# Patient Record
Sex: MALE | Race: WHITE | NOT HISPANIC OR LATINO | Employment: FULL TIME | URBAN - METROPOLITAN AREA
[De-identification: names, ages, dates, MRNs, and addresses within clinical notes are randomized per-mention and may not be internally consistent; named-entity substitution may affect disease eponyms.]

---

## 2017-01-01 ENCOUNTER — APPOINTMENT (OUTPATIENT)
Dept: RADIOLOGY | Facility: MEDICAL CENTER | Age: 82
DRG: 011 | End: 2017-01-01
Attending: HOSPITALIST
Payer: COMMERCIAL

## 2017-01-01 ENCOUNTER — HOME CARE VISIT (OUTPATIENT)
Dept: HOSPICE | Facility: HOSPICE | Age: 82
End: 2017-01-01
Payer: COMMERCIAL

## 2017-01-01 ENCOUNTER — HOSPITAL ENCOUNTER (EMERGENCY)
Facility: MEDICAL CENTER | Age: 82
End: 2017-12-23
Attending: EMERGENCY MEDICINE
Payer: COMMERCIAL

## 2017-01-01 ENCOUNTER — APPOINTMENT (OUTPATIENT)
Dept: RADIOLOGY | Facility: MEDICAL CENTER | Age: 82
DRG: 011 | End: 2017-01-01
Attending: INTERNAL MEDICINE
Payer: COMMERCIAL

## 2017-01-01 ENCOUNTER — APPOINTMENT (OUTPATIENT)
Dept: RADIOLOGY | Facility: MEDICAL CENTER | Age: 82
DRG: 011 | End: 2017-01-01
Attending: EMERGENCY MEDICINE
Payer: COMMERCIAL

## 2017-01-01 ENCOUNTER — APPOINTMENT (OUTPATIENT)
Dept: RADIOLOGY | Facility: MEDICAL CENTER | Age: 82
DRG: 011 | End: 2017-01-01
Attending: OTOLARYNGOLOGY
Payer: COMMERCIAL

## 2017-01-01 ENCOUNTER — HOSPICE ADMISSION (OUTPATIENT)
Dept: HOSPICE | Facility: HOSPICE | Age: 82
End: 2017-01-01
Payer: COMMERCIAL

## 2017-01-01 ENCOUNTER — APPOINTMENT (OUTPATIENT)
Dept: HOSPICE | Facility: HOSPICE | Age: 82
End: 2017-01-01
Payer: COMMERCIAL

## 2017-01-01 ENCOUNTER — RESOLUTE PROFESSIONAL BILLING HOSPITAL PROF FEE (OUTPATIENT)
Dept: HOSPITALIST | Facility: MEDICAL CENTER | Age: 82
End: 2017-01-01
Payer: COMMERCIAL

## 2017-01-01 ENCOUNTER — HOSPITAL ENCOUNTER (OUTPATIENT)
Facility: MEDICAL CENTER | Age: 82
End: 2017-11-30
Payer: COMMERCIAL

## 2017-01-01 ENCOUNTER — HOSPITAL ENCOUNTER (INPATIENT)
Facility: MEDICAL CENTER | Age: 82
LOS: 13 days | DRG: 011 | End: 2017-12-14
Attending: EMERGENCY MEDICINE | Admitting: HOSPITALIST
Payer: COMMERCIAL

## 2017-01-01 VITALS
HEART RATE: 70 BPM | OXYGEN SATURATION: 97 % | DIASTOLIC BLOOD PRESSURE: 62 MMHG | RESPIRATION RATE: 16 BRPM | SYSTOLIC BLOOD PRESSURE: 118 MMHG

## 2017-01-01 VITALS — HEART RATE: 56 BPM | OXYGEN SATURATION: 98 % | RESPIRATION RATE: 20 BRPM

## 2017-01-01 VITALS
HEIGHT: 66 IN | TEMPERATURE: 98.6 F | RESPIRATION RATE: 20 BRPM | WEIGHT: 104 LBS | HEART RATE: 72 BPM | OXYGEN SATURATION: 99 % | BODY MASS INDEX: 16.71 KG/M2 | SYSTOLIC BLOOD PRESSURE: 129 MMHG | DIASTOLIC BLOOD PRESSURE: 88 MMHG

## 2017-01-01 VITALS — SYSTOLIC BLOOD PRESSURE: 130 MMHG | RESPIRATION RATE: 22 BRPM | DIASTOLIC BLOOD PRESSURE: 76 MMHG | HEART RATE: 76 BPM

## 2017-01-01 VITALS
WEIGHT: 104.94 LBS | RESPIRATION RATE: 22 BRPM | OXYGEN SATURATION: 99 % | TEMPERATURE: 96.9 F | SYSTOLIC BLOOD PRESSURE: 153 MMHG | HEART RATE: 74 BPM | BODY MASS INDEX: 16.87 KG/M2 | DIASTOLIC BLOOD PRESSURE: 72 MMHG | HEIGHT: 66 IN

## 2017-01-01 VITALS
DIASTOLIC BLOOD PRESSURE: 80 MMHG | SYSTOLIC BLOOD PRESSURE: 132 MMHG | RESPIRATION RATE: 24 BRPM | HEART RATE: 63 BPM | OXYGEN SATURATION: 97 %

## 2017-01-01 VITALS
DIASTOLIC BLOOD PRESSURE: 97 MMHG | RESPIRATION RATE: 20 BRPM | HEART RATE: 81 BPM | SYSTOLIC BLOOD PRESSURE: 157 MMHG | OXYGEN SATURATION: 98 %

## 2017-01-01 VITALS
DIASTOLIC BLOOD PRESSURE: 75 MMHG | HEART RATE: 62 BPM | RESPIRATION RATE: 16 BRPM | OXYGEN SATURATION: 98 % | SYSTOLIC BLOOD PRESSURE: 150 MMHG

## 2017-01-01 VITALS
RESPIRATION RATE: 28 BRPM | SYSTOLIC BLOOD PRESSURE: 177 MMHG | OXYGEN SATURATION: 96 % | HEART RATE: 52 BPM | DIASTOLIC BLOOD PRESSURE: 58 MMHG

## 2017-01-01 VITALS
RESPIRATION RATE: 16 BRPM | HEART RATE: 56 BPM | OXYGEN SATURATION: 97 % | DIASTOLIC BLOOD PRESSURE: 84 MMHG | SYSTOLIC BLOOD PRESSURE: 116 MMHG

## 2017-01-01 VITALS
DIASTOLIC BLOOD PRESSURE: 74 MMHG | OXYGEN SATURATION: 98 % | SYSTOLIC BLOOD PRESSURE: 157 MMHG | HEART RATE: 56 BPM | RESPIRATION RATE: 18 BRPM

## 2017-01-01 VITALS
OXYGEN SATURATION: 99 % | RESPIRATION RATE: 72 BRPM | DIASTOLIC BLOOD PRESSURE: 70 MMHG | SYSTOLIC BLOOD PRESSURE: 152 MMHG | HEART RATE: 24 BPM

## 2017-01-01 VITALS — RESPIRATION RATE: 20 BRPM

## 2017-01-01 DIAGNOSIS — J95.03 TRACHEOSTOMY MALFUNCTION (HCC): ICD-10-CM

## 2017-01-01 DIAGNOSIS — J38.7 LARYNGEAL MASS: ICD-10-CM

## 2017-01-01 DIAGNOSIS — C14.0 THROAT CANCER (HCC): ICD-10-CM

## 2017-01-01 LAB
ALBUMIN SERPL BCP-MCNC: 2.1 G/DL (ref 3.2–4.9)
ALBUMIN SERPL BCP-MCNC: 2.3 G/DL (ref 3.2–4.9)
ALBUMIN SERPL BCP-MCNC: 2.4 G/DL (ref 3.2–4.9)
ALBUMIN SERPL BCP-MCNC: 2.5 G/DL (ref 3.2–4.9)
ALBUMIN SERPL BCP-MCNC: 3.4 G/DL (ref 3.2–4.9)
ALBUMIN/GLOB SERPL: 1 G/DL
ALBUMIN/GLOB SERPL: 1.1 G/DL
ALBUMIN/GLOB SERPL: 1.2 G/DL
ALP SERPL-CCNC: 59 U/L (ref 30–99)
ALP SERPL-CCNC: 63 U/L (ref 30–99)
ALP SERPL-CCNC: 64 U/L (ref 30–99)
ALP SERPL-CCNC: 70 U/L (ref 30–99)
ALP SERPL-CCNC: 83 U/L (ref 30–99)
ALT SERPL-CCNC: 11 U/L (ref 2–50)
ALT SERPL-CCNC: 12 U/L (ref 2–50)
ALT SERPL-CCNC: 13 U/L (ref 2–50)
ALT SERPL-CCNC: 13 U/L (ref 2–50)
ALT SERPL-CCNC: 8 U/L (ref 2–50)
ANION GAP SERPL CALC-SCNC: 10 MMOL/L (ref 0–11.9)
ANION GAP SERPL CALC-SCNC: 12 MMOL/L (ref 0–11.9)
ANION GAP SERPL CALC-SCNC: 5 MMOL/L (ref 0–11.9)
ANION GAP SERPL CALC-SCNC: 7 MMOL/L (ref 0–11.9)
ANION GAP SERPL CALC-SCNC: 8 MMOL/L (ref 0–11.9)
ANION GAP SERPL CALC-SCNC: 8 MMOL/L (ref 0–11.9)
ANION GAP SERPL CALC-SCNC: 9 MMOL/L (ref 0–11.9)
AST SERPL-CCNC: 14 U/L (ref 12–45)
AST SERPL-CCNC: 17 U/L (ref 12–45)
AST SERPL-CCNC: 18 U/L (ref 12–45)
AST SERPL-CCNC: 23 U/L (ref 12–45)
AST SERPL-CCNC: 57 U/L (ref 12–45)
BACTERIA BLD CULT: NORMAL
BACTERIA BLD CULT: NORMAL
BACTERIA SPEC RESP CULT: NORMAL
BASOPHILS # BLD AUTO: 0.1 % (ref 0–1.8)
BASOPHILS # BLD AUTO: 0.2 % (ref 0–1.8)
BASOPHILS # BLD AUTO: 0.3 % (ref 0–1.8)
BASOPHILS # BLD AUTO: 0.4 % (ref 0–1.8)
BASOPHILS # BLD: 0.01 K/UL (ref 0–0.12)
BASOPHILS # BLD: 0.03 K/UL (ref 0–0.12)
BASOPHILS # BLD: 0.04 K/UL (ref 0–0.12)
BASOPHILS # BLD: 0.06 K/UL (ref 0–0.12)
BILIRUB SERPL-MCNC: 0.5 MG/DL (ref 0.1–1.5)
BILIRUB SERPL-MCNC: 0.5 MG/DL (ref 0.1–1.5)
BILIRUB SERPL-MCNC: 0.6 MG/DL (ref 0.1–1.5)
BILIRUB SERPL-MCNC: 0.9 MG/DL (ref 0.1–1.5)
BILIRUB SERPL-MCNC: 1 MG/DL (ref 0.1–1.5)
BUN SERPL-MCNC: 11 MG/DL (ref 8–22)
BUN SERPL-MCNC: 12 MG/DL (ref 8–22)
BUN SERPL-MCNC: 15 MG/DL (ref 8–22)
BUN SERPL-MCNC: 21 MG/DL (ref 8–22)
BUN SERPL-MCNC: 28 MG/DL (ref 8–22)
BUN SERPL-MCNC: 35 MG/DL (ref 8–22)
BUN SERPL-MCNC: 7 MG/DL (ref 8–22)
BUN SERPL-MCNC: 7 MG/DL (ref 8–22)
BUN SERPL-MCNC: 9 MG/DL (ref 8–22)
CALCIUM SERPL-MCNC: 7.3 MG/DL (ref 8.5–10.5)
CALCIUM SERPL-MCNC: 7.6 MG/DL (ref 8.5–10.5)
CALCIUM SERPL-MCNC: 7.7 MG/DL (ref 8.5–10.5)
CALCIUM SERPL-MCNC: 7.9 MG/DL (ref 8.5–10.5)
CALCIUM SERPL-MCNC: 7.9 MG/DL (ref 8.5–10.5)
CALCIUM SERPL-MCNC: 8.2 MG/DL (ref 8.5–10.5)
CALCIUM SERPL-MCNC: 8.7 MG/DL (ref 8.5–10.5)
CALCIUM SERPL-MCNC: 8.8 MG/DL (ref 8.5–10.5)
CALCIUM SERPL-MCNC: 9.2 MG/DL (ref 8.5–10.5)
CHLORIDE SERPL-SCNC: 102 MMOL/L (ref 96–112)
CHLORIDE SERPL-SCNC: 104 MMOL/L (ref 96–112)
CHLORIDE SERPL-SCNC: 104 MMOL/L (ref 96–112)
CHLORIDE SERPL-SCNC: 105 MMOL/L (ref 96–112)
CHLORIDE SERPL-SCNC: 106 MMOL/L (ref 96–112)
CHLORIDE SERPL-SCNC: 107 MMOL/L (ref 96–112)
CHLORIDE SERPL-SCNC: 109 MMOL/L (ref 96–112)
CHLORIDE SERPL-SCNC: 112 MMOL/L (ref 96–112)
CHLORIDE SERPL-SCNC: 114 MMOL/L (ref 96–112)
CO2 SERPL-SCNC: 16 MMOL/L (ref 20–33)
CO2 SERPL-SCNC: 18 MMOL/L (ref 20–33)
CO2 SERPL-SCNC: 18 MMOL/L (ref 20–33)
CO2 SERPL-SCNC: 21 MMOL/L (ref 20–33)
CO2 SERPL-SCNC: 24 MMOL/L (ref 20–33)
CO2 SERPL-SCNC: 24 MMOL/L (ref 20–33)
CO2 SERPL-SCNC: 25 MMOL/L (ref 20–33)
CO2 SERPL-SCNC: 25 MMOL/L (ref 20–33)
CO2 SERPL-SCNC: 29 MMOL/L (ref 20–33)
CREAT SERPL-MCNC: 0.39 MG/DL (ref 0.5–1.4)
CREAT SERPL-MCNC: 0.4 MG/DL (ref 0.5–1.4)
CREAT SERPL-MCNC: 0.47 MG/DL (ref 0.5–1.4)
CREAT SERPL-MCNC: 0.48 MG/DL (ref 0.5–1.4)
CREAT SERPL-MCNC: 0.5 MG/DL (ref 0.5–1.4)
CREAT SERPL-MCNC: 0.51 MG/DL (ref 0.5–1.4)
CREAT SERPL-MCNC: 0.71 MG/DL (ref 0.5–1.4)
CREAT SERPL-MCNC: 1.03 MG/DL (ref 0.5–1.4)
CREAT SERPL-MCNC: 2.04 MG/DL (ref 0.5–1.4)
CRP SERPL HS-MCNC: 2.67 MG/DL (ref 0–0.75)
EKG IMPRESSION: NORMAL
EKG IMPRESSION: NORMAL
EOSINOPHIL # BLD AUTO: 0 K/UL (ref 0–0.51)
EOSINOPHIL # BLD AUTO: 0 K/UL (ref 0–0.51)
EOSINOPHIL # BLD AUTO: 0.01 K/UL (ref 0–0.51)
EOSINOPHIL # BLD AUTO: 0.01 K/UL (ref 0–0.51)
EOSINOPHIL # BLD AUTO: 0.03 K/UL (ref 0–0.51)
EOSINOPHIL # BLD AUTO: 0.08 K/UL (ref 0–0.51)
EOSINOPHIL NFR BLD: 0 % (ref 0–6.9)
EOSINOPHIL NFR BLD: 0 % (ref 0–6.9)
EOSINOPHIL NFR BLD: 0.1 % (ref 0–6.9)
EOSINOPHIL NFR BLD: 0.1 % (ref 0–6.9)
EOSINOPHIL NFR BLD: 0.2 % (ref 0–6.9)
EOSINOPHIL NFR BLD: 0.7 % (ref 0–6.9)
ERYTHROCYTE [DISTWIDTH] IN BLOOD BY AUTOMATED COUNT: 41.9 FL (ref 35.9–50)
ERYTHROCYTE [DISTWIDTH] IN BLOOD BY AUTOMATED COUNT: 44.8 FL (ref 35.9–50)
ERYTHROCYTE [DISTWIDTH] IN BLOOD BY AUTOMATED COUNT: 45.4 FL (ref 35.9–50)
ERYTHROCYTE [DISTWIDTH] IN BLOOD BY AUTOMATED COUNT: 45.8 FL (ref 35.9–50)
ERYTHROCYTE [DISTWIDTH] IN BLOOD BY AUTOMATED COUNT: 46.8 FL (ref 35.9–50)
ERYTHROCYTE [DISTWIDTH] IN BLOOD BY AUTOMATED COUNT: 47.3 FL (ref 35.9–50)
ERYTHROCYTE [DISTWIDTH] IN BLOOD BY AUTOMATED COUNT: 48.2 FL (ref 35.9–50)
ERYTHROCYTE [DISTWIDTH] IN BLOOD BY AUTOMATED COUNT: 48.9 FL (ref 35.9–50)
GFR SERPL CREATININE-BSD FRML MDRD: 31 ML/MIN/1.73 M 2
GFR SERPL CREATININE-BSD FRML MDRD: >60 ML/MIN/1.73 M 2
GLOBULIN SER CALC-MCNC: 2.2 G/DL (ref 1.9–3.5)
GLOBULIN SER CALC-MCNC: 2.3 G/DL (ref 1.9–3.5)
GLOBULIN SER CALC-MCNC: 2.3 G/DL (ref 1.9–3.5)
GLOBULIN SER CALC-MCNC: 2.4 G/DL (ref 1.9–3.5)
GLOBULIN SER CALC-MCNC: 2.9 G/DL (ref 1.9–3.5)
GLUCOSE SERPL-MCNC: 107 MG/DL (ref 65–99)
GLUCOSE SERPL-MCNC: 115 MG/DL (ref 65–99)
GLUCOSE SERPL-MCNC: 116 MG/DL (ref 65–99)
GLUCOSE SERPL-MCNC: 124 MG/DL (ref 65–99)
GLUCOSE SERPL-MCNC: 126 MG/DL (ref 65–99)
GLUCOSE SERPL-MCNC: 129 MG/DL (ref 65–99)
GLUCOSE SERPL-MCNC: 152 MG/DL (ref 65–99)
GLUCOSE SERPL-MCNC: 82 MG/DL (ref 65–99)
GLUCOSE SERPL-MCNC: 91 MG/DL (ref 65–99)
GRAM STN SPEC: NORMAL
HCT VFR BLD AUTO: 34.1 % (ref 42–52)
HCT VFR BLD AUTO: 34.7 % (ref 42–52)
HCT VFR BLD AUTO: 36.5 % (ref 42–52)
HCT VFR BLD AUTO: 39.4 % (ref 42–52)
HCT VFR BLD AUTO: 39.6 % (ref 42–52)
HCT VFR BLD AUTO: 39.8 % (ref 42–52)
HCT VFR BLD AUTO: 40.1 % (ref 42–52)
HCT VFR BLD AUTO: 42.1 % (ref 42–52)
HGB BLD-MCNC: 12.1 G/DL (ref 14–18)
HGB BLD-MCNC: 12.2 G/DL (ref 14–18)
HGB BLD-MCNC: 12.7 G/DL (ref 14–18)
HGB BLD-MCNC: 13.3 G/DL (ref 14–18)
HGB BLD-MCNC: 13.3 G/DL (ref 14–18)
HGB BLD-MCNC: 13.4 G/DL (ref 14–18)
HGB BLD-MCNC: 13.7 G/DL (ref 14–18)
HGB BLD-MCNC: 14.3 G/DL (ref 14–18)
IMM GRANULOCYTES # BLD AUTO: 0.06 K/UL (ref 0–0.11)
IMM GRANULOCYTES # BLD AUTO: 0.08 K/UL (ref 0–0.11)
IMM GRANULOCYTES # BLD AUTO: 0.09 K/UL (ref 0–0.11)
IMM GRANULOCYTES # BLD AUTO: 0.1 K/UL (ref 0–0.11)
IMM GRANULOCYTES # BLD AUTO: 0.14 K/UL (ref 0–0.11)
IMM GRANULOCYTES # BLD AUTO: 0.19 K/UL (ref 0–0.11)
IMM GRANULOCYTES NFR BLD AUTO: 0.5 % (ref 0–0.9)
IMM GRANULOCYTES NFR BLD AUTO: 0.5 % (ref 0–0.9)
IMM GRANULOCYTES NFR BLD AUTO: 0.6 % (ref 0–0.9)
IMM GRANULOCYTES NFR BLD AUTO: 0.6 % (ref 0–0.9)
IMM GRANULOCYTES NFR BLD AUTO: 0.7 % (ref 0–0.9)
IMM GRANULOCYTES NFR BLD AUTO: 1 % (ref 0–0.9)
LV EJECT FRACT  99904: 55
LV EJECT FRACT MOD 2C 99903: 61.5
LV EJECT FRACT MOD 4C 99902: 54.57
LV EJECT FRACT MOD BP 99901: 55.81
LYMPHOCYTES # BLD AUTO: 0.54 K/UL (ref 1–4.8)
LYMPHOCYTES # BLD AUTO: 0.86 K/UL (ref 1–4.8)
LYMPHOCYTES # BLD AUTO: 0.89 K/UL (ref 1–4.8)
LYMPHOCYTES # BLD AUTO: 0.97 K/UL (ref 1–4.8)
LYMPHOCYTES # BLD AUTO: 1.3 K/UL (ref 1–4.8)
LYMPHOCYTES # BLD AUTO: 1.74 K/UL (ref 1–4.8)
LYMPHOCYTES NFR BLD: 15.6 % (ref 22–41)
LYMPHOCYTES NFR BLD: 2.3 % (ref 22–41)
LYMPHOCYTES NFR BLD: 5.3 % (ref 22–41)
LYMPHOCYTES NFR BLD: 6.5 % (ref 22–41)
LYMPHOCYTES NFR BLD: 6.6 % (ref 22–41)
LYMPHOCYTES NFR BLD: 7.2 % (ref 22–41)
MAGNESIUM SERPL-MCNC: 1.5 MG/DL (ref 1.5–2.5)
MAGNESIUM SERPL-MCNC: 1.6 MG/DL (ref 1.5–2.5)
MAGNESIUM SERPL-MCNC: 1.7 MG/DL (ref 1.5–2.5)
MAGNESIUM SERPL-MCNC: 1.9 MG/DL (ref 1.5–2.5)
MAGNESIUM SERPL-MCNC: 2.1 MG/DL (ref 1.5–2.5)
MCH RBC QN AUTO: 28.7 PG (ref 27–33)
MCH RBC QN AUTO: 28.8 PG (ref 27–33)
MCH RBC QN AUTO: 28.9 PG (ref 27–33)
MCH RBC QN AUTO: 29 PG (ref 27–33)
MCH RBC QN AUTO: 29.1 PG (ref 27–33)
MCH RBC QN AUTO: 29.4 PG (ref 27–33)
MCH RBC QN AUTO: 29.4 PG (ref 27–33)
MCH RBC QN AUTO: 29.8 PG (ref 27–33)
MCHC RBC AUTO-ENTMCNC: 33.4 G/DL (ref 33.7–35.3)
MCHC RBC AUTO-ENTMCNC: 33.6 G/DL (ref 33.7–35.3)
MCHC RBC AUTO-ENTMCNC: 34 G/DL (ref 33.7–35.3)
MCHC RBC AUTO-ENTMCNC: 34 G/DL (ref 33.7–35.3)
MCHC RBC AUTO-ENTMCNC: 34.2 G/DL (ref 33.7–35.3)
MCHC RBC AUTO-ENTMCNC: 34.8 G/DL (ref 33.7–35.3)
MCHC RBC AUTO-ENTMCNC: 34.9 G/DL (ref 33.7–35.3)
MCHC RBC AUTO-ENTMCNC: 35.8 G/DL (ref 33.7–35.3)
MCV RBC AUTO: 82.4 FL (ref 81.4–97.8)
MCV RBC AUTO: 83.4 FL (ref 81.4–97.8)
MCV RBC AUTO: 84.2 FL (ref 81.4–97.8)
MCV RBC AUTO: 84.2 FL (ref 81.4–97.8)
MCV RBC AUTO: 85.4 FL (ref 81.4–97.8)
MCV RBC AUTO: 85.5 FL (ref 81.4–97.8)
MCV RBC AUTO: 86.1 FL (ref 81.4–97.8)
MCV RBC AUTO: 88.1 FL (ref 81.4–97.8)
MONOCYTES # BLD AUTO: 1.05 K/UL (ref 0–0.85)
MONOCYTES # BLD AUTO: 1.3 K/UL (ref 0–0.85)
MONOCYTES # BLD AUTO: 1.33 K/UL (ref 0–0.85)
MONOCYTES # BLD AUTO: 1.52 K/UL (ref 0–0.85)
MONOCYTES # BLD AUTO: 1.66 K/UL (ref 0–0.85)
MONOCYTES # BLD AUTO: 1.81 K/UL (ref 0–0.85)
MONOCYTES NFR BLD AUTO: 10.8 % (ref 0–13.4)
MONOCYTES NFR BLD AUTO: 12.2 % (ref 0–13.4)
MONOCYTES NFR BLD AUTO: 6.6 % (ref 0–13.4)
MONOCYTES NFR BLD AUTO: 8 % (ref 0–13.4)
MONOCYTES NFR BLD AUTO: 8.5 % (ref 0–13.4)
MONOCYTES NFR BLD AUTO: 9.4 % (ref 0–13.4)
NEUTROPHILS # BLD AUTO: 11.98 K/UL (ref 1.82–7.42)
NEUTROPHILS # BLD AUTO: 13.91 K/UL (ref 1.82–7.42)
NEUTROPHILS # BLD AUTO: 16.46 K/UL (ref 1.82–7.42)
NEUTROPHILS # BLD AUTO: 20.84 K/UL (ref 1.82–7.42)
NEUTROPHILS # BLD AUTO: 8.15 K/UL (ref 1.82–7.42)
NEUTROPHILS # BLD AUTO: 9.94 K/UL (ref 1.82–7.42)
NEUTROPHILS NFR BLD: 73.4 % (ref 44–72)
NEUTROPHILS NFR BLD: 80.6 % (ref 44–72)
NEUTROPHILS NFR BLD: 81 % (ref 44–72)
NEUTROPHILS NFR BLD: 83.7 % (ref 44–72)
NEUTROPHILS NFR BLD: 85.9 % (ref 44–72)
NEUTROPHILS NFR BLD: 90.2 % (ref 44–72)
NRBC # BLD AUTO: 0 K/UL
NRBC BLD AUTO-RTO: 0 /100 WBC
PHOSPHATE SERPL-MCNC: 1.9 MG/DL (ref 2.5–4.5)
PHOSPHATE SERPL-MCNC: 2.1 MG/DL (ref 2.5–4.5)
PHOSPHATE SERPL-MCNC: 2.2 MG/DL (ref 2.5–4.5)
PHOSPHATE SERPL-MCNC: 2.5 MG/DL (ref 2.5–4.5)
PHOSPHATE SERPL-MCNC: 2.7 MG/DL (ref 2.5–4.5)
PHOSPHATE SERPL-MCNC: 3 MG/DL (ref 2.5–4.5)
PHOSPHATE SERPL-MCNC: 3.1 MG/DL (ref 2.5–4.5)
PHOSPHATE SERPL-MCNC: 4 MG/DL (ref 2.5–4.5)
PLATELET # BLD AUTO: 358 K/UL (ref 164–446)
PLATELET # BLD AUTO: 378 K/UL (ref 164–446)
PLATELET # BLD AUTO: 396 K/UL (ref 164–446)
PLATELET # BLD AUTO: 405 K/UL (ref 164–446)
PLATELET # BLD AUTO: 427 K/UL (ref 164–446)
PLATELET # BLD AUTO: 437 K/UL (ref 164–446)
PLATELET # BLD AUTO: 473 K/UL (ref 164–446)
PLATELET # BLD AUTO: 522 K/UL (ref 164–446)
PMV BLD AUTO: 10.3 FL (ref 9–12.9)
PMV BLD AUTO: 9.1 FL (ref 9–12.9)
PMV BLD AUTO: 9.2 FL (ref 9–12.9)
PMV BLD AUTO: 9.4 FL (ref 9–12.9)
PMV BLD AUTO: 9.6 FL (ref 9–12.9)
PMV BLD AUTO: 9.6 FL (ref 9–12.9)
PMV BLD AUTO: 9.7 FL (ref 9–12.9)
PMV BLD AUTO: 9.9 FL (ref 9–12.9)
POTASSIUM SERPL-SCNC: 2.8 MMOL/L (ref 3.6–5.5)
POTASSIUM SERPL-SCNC: 2.8 MMOL/L (ref 3.6–5.5)
POTASSIUM SERPL-SCNC: 3.1 MMOL/L (ref 3.6–5.5)
POTASSIUM SERPL-SCNC: 3.3 MMOL/L (ref 3.6–5.5)
POTASSIUM SERPL-SCNC: 3.4 MMOL/L (ref 3.6–5.5)
POTASSIUM SERPL-SCNC: 3.5 MMOL/L (ref 3.6–5.5)
POTASSIUM SERPL-SCNC: 3.5 MMOL/L (ref 3.6–5.5)
POTASSIUM SERPL-SCNC: 3.8 MMOL/L (ref 3.6–5.5)
POTASSIUM SERPL-SCNC: 3.9 MMOL/L (ref 3.6–5.5)
PREALB SERPL-MCNC: 5 MG/DL (ref 18–38)
PROT SERPL-MCNC: 4.3 G/DL (ref 6–8.2)
PROT SERPL-MCNC: 4.6 G/DL (ref 6–8.2)
PROT SERPL-MCNC: 4.8 G/DL (ref 6–8.2)
PROT SERPL-MCNC: 4.8 G/DL (ref 6–8.2)
PROT SERPL-MCNC: 6.3 G/DL (ref 6–8.2)
RBC # BLD AUTO: 4.09 M/UL (ref 4.7–6.1)
RBC # BLD AUTO: 4.12 M/UL (ref 4.7–6.1)
RBC # BLD AUTO: 4.43 M/UL (ref 4.7–6.1)
RBC # BLD AUTO: 4.52 M/UL (ref 4.7–6.1)
RBC # BLD AUTO: 4.6 M/UL (ref 4.7–6.1)
RBC # BLD AUTO: 4.61 M/UL (ref 4.7–6.1)
RBC # BLD AUTO: 4.76 M/UL (ref 4.7–6.1)
RBC # BLD AUTO: 4.93 M/UL (ref 4.7–6.1)
SIGNIFICANT IND 70042: NORMAL
SITE SITE: NORMAL
SODIUM SERPL-SCNC: 136 MMOL/L (ref 135–145)
SODIUM SERPL-SCNC: 136 MMOL/L (ref 135–145)
SODIUM SERPL-SCNC: 137 MMOL/L (ref 135–145)
SODIUM SERPL-SCNC: 138 MMOL/L (ref 135–145)
SODIUM SERPL-SCNC: 139 MMOL/L (ref 135–145)
SOURCE SOURCE: NORMAL
T4 FREE SERPL-MCNC: 1.39 NG/DL (ref 0.53–1.43)
T4 SERPL-MCNC: 7.2 UG/DL (ref 4–12)
TSH SERPL DL<=0.005 MIU/L-ACNC: 0.47 UIU/ML (ref 0.3–3.7)
WBC # BLD AUTO: 11.1 K/UL (ref 4.8–10.8)
WBC # BLD AUTO: 12.3 K/UL (ref 4.8–10.8)
WBC # BLD AUTO: 14.9 K/UL (ref 4.8–10.8)
WBC # BLD AUTO: 14.9 K/UL (ref 4.8–10.8)
WBC # BLD AUTO: 15.8 K/UL (ref 4.8–10.8)
WBC # BLD AUTO: 16.2 K/UL (ref 4.8–10.8)
WBC # BLD AUTO: 19.6 K/UL (ref 4.8–10.8)
WBC # BLD AUTO: 23.1 K/UL (ref 4.8–10.8)

## 2017-01-01 PROCEDURE — 700105 HCHG RX REV CODE 258

## 2017-01-01 PROCEDURE — 770020 HCHG ROOM/CARE - TELE (206)

## 2017-01-01 PROCEDURE — 700111 HCHG RX REV CODE 636 W/ 250 OVERRIDE (IP): Performed by: HOSPITALIST

## 2017-01-01 PROCEDURE — 0CBS8ZX EXCISION OF LARYNX, VIA NATURAL OR ARTIFICIAL OPENING ENDOSCOPIC, DIAGNOSTIC: ICD-10-PCS | Performed by: OTOLARYNGOLOGY

## 2017-01-01 PROCEDURE — A9270 NON-COVERED ITEM OR SERVICE: HCPCS | Performed by: HOSPITALIST

## 2017-01-01 PROCEDURE — 85025 COMPLETE CBC W/AUTO DIFF WBC: CPT

## 2017-01-01 PROCEDURE — 36415 COLL VENOUS BLD VENIPUNCTURE: CPT

## 2017-01-01 PROCEDURE — 700111 HCHG RX REV CODE 636 W/ 250 OVERRIDE (IP): Performed by: OTOLARYNGOLOGY

## 2017-01-01 PROCEDURE — 94640 AIRWAY INHALATION TREATMENT: CPT

## 2017-01-01 PROCEDURE — G0299 HHS/HOSPICE OF RN EA 15 MIN: HCPCS

## 2017-01-01 PROCEDURE — 700102 HCHG RX REV CODE 250 W/ 637 OVERRIDE(OP): Performed by: HOSPITALIST

## 2017-01-01 PROCEDURE — 700111 HCHG RX REV CODE 636 W/ 250 OVERRIDE (IP): Performed by: INTERNAL MEDICINE

## 2017-01-01 PROCEDURE — 99498 ADVNCD CARE PLAN ADDL 30 MIN: CPT | Performed by: HOSPITALIST

## 2017-01-01 PROCEDURE — 93306 TTE W/DOPPLER COMPLETE: CPT | Mod: 26 | Performed by: INTERNAL MEDICINE

## 2017-01-01 PROCEDURE — S9126 HOSPICE CARE, IN THE HOME, P: HCPCS

## 2017-01-01 PROCEDURE — 88305 TISSUE EXAM BY PATHOLOGIST: CPT

## 2017-01-01 PROCEDURE — 700101 HCHG RX REV CODE 250: Performed by: INTERNAL MEDICINE

## 2017-01-01 PROCEDURE — 700105 HCHG RX REV CODE 258: Performed by: INTERNAL MEDICINE

## 2017-01-01 PROCEDURE — 99232 SBSQ HOSP IP/OBS MODERATE 35: CPT | Performed by: INTERNAL MEDICINE

## 2017-01-01 PROCEDURE — 0DJ08ZZ INSPECTION OF UPPER INTESTINAL TRACT, VIA NATURAL OR ARTIFICIAL OPENING ENDOSCOPIC: ICD-10-PCS | Performed by: OTOLARYNGOLOGY

## 2017-01-01 PROCEDURE — 93010 ELECTROCARDIOGRAM REPORT: CPT | Performed by: INTERNAL MEDICINE

## 2017-01-01 PROCEDURE — 99497 ADVNCD CARE PLAN 30 MIN: CPT | Performed by: HOSPITALIST

## 2017-01-01 PROCEDURE — 99233 SBSQ HOSP IP/OBS HIGH 50: CPT | Performed by: FAMILY MEDICINE

## 2017-01-01 PROCEDURE — 501838 HCHG SUTURE GENERAL: Performed by: OTOLARYNGOLOGY

## 2017-01-01 PROCEDURE — 700111 HCHG RX REV CODE 636 W/ 250 OVERRIDE (IP)

## 2017-01-01 PROCEDURE — 76775 US EXAM ABDO BACK WALL LIM: CPT

## 2017-01-01 PROCEDURE — 84100 ASSAY OF PHOSPHORUS: CPT

## 2017-01-01 PROCEDURE — 160009 HCHG ANES TIME/MIN: Performed by: OTOLARYNGOLOGY

## 2017-01-01 PROCEDURE — G8979 MOBILITY GOAL STATUS: HCPCS | Mod: CJ

## 2017-01-01 PROCEDURE — 87070 CULTURE OTHR SPECIMN AEROBIC: CPT

## 2017-01-01 PROCEDURE — 80053 COMPREHEN METABOLIC PANEL: CPT

## 2017-01-01 PROCEDURE — 86140 C-REACTIVE PROTEIN: CPT

## 2017-01-01 PROCEDURE — G0156 HHCP-SVS OF AIDE,EA 15 MIN: HCPCS

## 2017-01-01 PROCEDURE — 83735 ASSAY OF MAGNESIUM: CPT

## 2017-01-01 PROCEDURE — 0B110F4 BYPASS TRACHEA TO CUTANEOUS WITH TRACHEOSTOMY DEVICE, OPEN APPROACH: ICD-10-PCS | Performed by: OTOLARYNGOLOGY

## 2017-01-01 PROCEDURE — G9162 LANG EXPRESS CURRENT STATUS: HCPCS | Mod: CM

## 2017-01-01 PROCEDURE — 94760 N-INVAS EAR/PLS OXIMETRY 1: CPT

## 2017-01-01 PROCEDURE — 6650303 HCR  ALOE VESTA BATH/BODY SHAMPOO

## 2017-01-01 PROCEDURE — 700105 HCHG RX REV CODE 258: Performed by: HOSPITALIST

## 2017-01-01 PROCEDURE — A9270 NON-COVERED ITEM OR SERVICE: HCPCS | Performed by: INTERNAL MEDICINE

## 2017-01-01 PROCEDURE — 84443 ASSAY THYROID STIM HORMONE: CPT

## 2017-01-01 PROCEDURE — G0155 HHCP-SVS OF CSW,EA 15 MIN: HCPCS

## 2017-01-01 PROCEDURE — 99233 SBSQ HOSP IP/OBS HIGH 50: CPT | Performed by: HOSPITALIST

## 2017-01-01 PROCEDURE — 93306 TTE W/DOPPLER COMPLETE: CPT

## 2017-01-01 PROCEDURE — G8988 SELF CARE GOAL STATUS: HCPCS | Mod: CI

## 2017-01-01 PROCEDURE — 88342 IMHCHEM/IMCYTCHM 1ST ANTB: CPT | Mod: 91

## 2017-01-01 PROCEDURE — 770004 HCHG ROOM/CARE - ONCOLOGY PRIVATE *

## 2017-01-01 PROCEDURE — 700101 HCHG RX REV CODE 250: Performed by: HOSPITALIST

## 2017-01-01 PROCEDURE — 770022 HCHG ROOM/CARE - ICU (200)

## 2017-01-01 PROCEDURE — A6250 SKIN SEAL PROTECT MOISTURIZR: HCPCS

## 2017-01-01 PROCEDURE — 51798 US URINE CAPACITY MEASURE: CPT

## 2017-01-01 PROCEDURE — A5120 SKIN BARRIER, WIPE OR SWAB: HCPCS

## 2017-01-01 PROCEDURE — G8988 SELF CARE GOAL STATUS: HCPCS | Mod: CK

## 2017-01-01 PROCEDURE — 99233 SBSQ HOSP IP/OBS HIGH 50: CPT | Mod: 25 | Performed by: HOSPITALIST

## 2017-01-01 PROCEDURE — 80048 BASIC METABOLIC PNL TOTAL CA: CPT

## 2017-01-01 PROCEDURE — 160048 HCHG OR STATISTICAL LEVEL 1-5: Performed by: OTOLARYNGOLOGY

## 2017-01-01 PROCEDURE — 306796: Performed by: OTOLARYNGOLOGY

## 2017-01-01 PROCEDURE — 85027 COMPLETE CBC AUTOMATED: CPT

## 2017-01-01 PROCEDURE — 93005 ELECTROCARDIOGRAM TRACING: CPT | Performed by: HOSPITALIST

## 2017-01-01 PROCEDURE — 99284 EMERGENCY DEPT VISIT MOD MDM: CPT

## 2017-01-01 PROCEDURE — 99223 1ST HOSP IP/OBS HIGH 75: CPT | Performed by: HOSPITALIST

## 2017-01-01 PROCEDURE — 74000 DX-ABDOMEN-1 VIEW: CPT

## 2017-01-01 PROCEDURE — G8978 MOBILITY CURRENT STATUS: HCPCS | Mod: CK

## 2017-01-01 PROCEDURE — L8501 TRACHEOSTOMY SPEAKING VALVE: HCPCS

## 2017-01-01 PROCEDURE — 84439 ASSAY OF FREE THYROXINE: CPT

## 2017-01-01 PROCEDURE — 160039 HCHG SURGERY MINUTES - EA ADDL 1 MIN LEVEL 3: Performed by: OTOLARYNGOLOGY

## 2017-01-01 PROCEDURE — 71010 DX-CHEST-PORTABLE (1 VIEW): CPT

## 2017-01-01 PROCEDURE — 97762 HCHG PROSTHESIS CHECKOUT EA 15 MIN: CPT

## 2017-01-01 PROCEDURE — 99291 CRITICAL CARE FIRST HOUR: CPT | Performed by: INTERNAL MEDICINE

## 2017-01-01 PROCEDURE — 87205 SMEAR GRAM STAIN: CPT

## 2017-01-01 PROCEDURE — G8987 SELF CARE CURRENT STATUS: HCPCS | Mod: CK

## 2017-01-01 PROCEDURE — 87040 BLOOD CULTURE FOR BACTERIA: CPT

## 2017-01-01 PROCEDURE — A4520 INCONTINENCE GARMENT ANYTYPE: HCPCS

## 2017-01-01 PROCEDURE — 160028 HCHG SURGERY MINUTES - 1ST 30 MINS LEVEL 3: Performed by: OTOLARYNGOLOGY

## 2017-01-01 PROCEDURE — 770001 HCHG ROOM/CARE - MED/SURG/GYN PRIV*

## 2017-01-01 PROCEDURE — 70491 CT SOFT TISSUE NECK W/DYE: CPT

## 2017-01-01 PROCEDURE — 665036 HSPC NOTICE OF ELECTION NOE

## 2017-01-01 PROCEDURE — A4554 DISPOSABLE UNDERPADS: HCPCS

## 2017-01-01 PROCEDURE — 93005 ELECTROCARDIOGRAM TRACING: CPT | Performed by: OTOLARYNGOLOGY

## 2017-01-01 PROCEDURE — G9163 LANG EXPRESS GOAL STATUS: HCPCS | Mod: CI

## 2017-01-01 PROCEDURE — 99239 HOSP IP/OBS DSCHRG MGMT >30: CPT | Performed by: FAMILY MEDICINE

## 2017-01-01 PROCEDURE — 700102 HCHG RX REV CODE 250 W/ 637 OVERRIDE(OP): Performed by: INTERNAL MEDICINE

## 2017-01-01 PROCEDURE — 70360 X-RAY EXAM OF NECK: CPT

## 2017-01-01 PROCEDURE — 99285 EMERGENCY DEPT VISIT HI MDM: CPT

## 2017-01-01 PROCEDURE — 92522 EVALUATE SPEECH PRODUCTION: CPT

## 2017-01-01 PROCEDURE — 97162 PT EVAL MOD COMPLEX 30 MIN: CPT

## 2017-01-01 PROCEDURE — 84134 ASSAY OF PREALBUMIN: CPT

## 2017-01-01 PROCEDURE — 71260 CT THORAX DX C+: CPT

## 2017-01-01 PROCEDURE — 700101 HCHG RX REV CODE 250

## 2017-01-01 PROCEDURE — 97166 OT EVAL MOD COMPLEX 45 MIN: CPT

## 2017-01-01 PROCEDURE — 700117 HCHG RX CONTRAST REV CODE 255: Performed by: EMERGENCY MEDICINE

## 2017-01-01 PROCEDURE — G8989 SELF CARE D/C STATUS: HCPCS | Mod: CK

## 2017-01-01 RX ORDER — AMIODARONE HYDROCHLORIDE 200 MG/1
400 TABLET ORAL TWICE DAILY
Status: DISCONTINUED | OUTPATIENT
Start: 2017-01-01 | End: 2017-01-01

## 2017-01-01 RX ORDER — POTASSIUM CHLORIDE 7.45 MG/ML
10 INJECTION INTRAVENOUS
Status: DISCONTINUED | OUTPATIENT
Start: 2017-01-01 | End: 2017-01-01

## 2017-01-01 RX ORDER — POLYETHYLENE GLYCOL 3350 17 G/17G
1 POWDER, FOR SOLUTION ORAL
Status: DISCONTINUED | OUTPATIENT
Start: 2017-01-01 | End: 2017-01-01

## 2017-01-01 RX ORDER — HEPARIN SODIUM 5000 [USP'U]/ML
5000 INJECTION, SOLUTION INTRAVENOUS; SUBCUTANEOUS EVERY 8 HOURS
Status: DISCONTINUED | OUTPATIENT
Start: 2017-01-01 | End: 2017-01-01

## 2017-01-01 RX ORDER — MAGNESIUM SULFATE HEPTAHYDRATE 40 MG/ML
4 INJECTION, SOLUTION INTRAVENOUS ONCE
Status: COMPLETED | OUTPATIENT
Start: 2017-01-01 | End: 2017-01-01

## 2017-01-01 RX ORDER — MORPHINE SULFATE 4 MG/ML
2 INJECTION, SOLUTION INTRAMUSCULAR; INTRAVENOUS
Status: DISCONTINUED | OUTPATIENT
Start: 2017-01-01 | End: 2017-01-01 | Stop reason: HOSPADM

## 2017-01-01 RX ORDER — MAGNESIUM SULFATE HEPTAHYDRATE 40 MG/ML
4 INJECTION, SOLUTION INTRAVENOUS ONCE
Status: DISCONTINUED | OUTPATIENT
Start: 2017-01-01 | End: 2017-01-01

## 2017-01-01 RX ORDER — ONDANSETRON 2 MG/ML
4 INJECTION INTRAMUSCULAR; INTRAVENOUS EVERY 4 HOURS PRN
Status: DISCONTINUED | OUTPATIENT
Start: 2017-01-01 | End: 2017-01-01 | Stop reason: HOSPADM

## 2017-01-01 RX ORDER — SODIUM CHLORIDE AND POTASSIUM CHLORIDE 300; 900 MG/100ML; MG/100ML
INJECTION, SOLUTION INTRAVENOUS ONCE
Status: DISCONTINUED | OUTPATIENT
Start: 2017-01-01 | End: 2017-01-01

## 2017-01-01 RX ORDER — BISACODYL 10 MG
10 SUPPOSITORY, RECTAL RECTAL
Status: DISCONTINUED | OUTPATIENT
Start: 2017-01-01 | End: 2017-01-01

## 2017-01-01 RX ORDER — ATROPINE SULFATE 10 MG/ML
2 SOLUTION/ DROPS OPHTHALMIC EVERY 4 HOURS PRN
Status: DISCONTINUED | OUTPATIENT
Start: 2017-01-01 | End: 2017-01-01 | Stop reason: HOSPADM

## 2017-01-01 RX ORDER — DEXTROSE MONOHYDRATE 50 MG/ML
INJECTION, SOLUTION INTRAVENOUS
Status: COMPLETED
Start: 2017-01-01 | End: 2017-01-01

## 2017-01-01 RX ORDER — ONDANSETRON 2 MG/ML
8 INJECTION INTRAMUSCULAR; INTRAVENOUS EVERY 8 HOURS PRN
Status: DISCONTINUED | OUTPATIENT
Start: 2017-01-01 | End: 2017-01-01 | Stop reason: HOSPADM

## 2017-01-01 RX ORDER — MORPHINE SULFATE 100 MG/5ML
10 SOLUTION ORAL
Status: DISCONTINUED | OUTPATIENT
Start: 2017-01-01 | End: 2017-01-01 | Stop reason: HOSPADM

## 2017-01-01 RX ORDER — SCOLOPAMINE TRANSDERMAL SYSTEM 1 MG/1
1 PATCH, EXTENDED RELEASE TRANSDERMAL
Status: DISCONTINUED | OUTPATIENT
Start: 2017-01-01 | End: 2017-01-01 | Stop reason: HOSPADM

## 2017-01-01 RX ORDER — HALOPERIDOL 5 MG/ML
2-5 INJECTION INTRAMUSCULAR EVERY 4 HOURS PRN
Status: DISCONTINUED | OUTPATIENT
Start: 2017-01-01 | End: 2017-01-01 | Stop reason: HOSPADM

## 2017-01-01 RX ORDER — MORPHINE SULFATE 10 MG/ML
5 INJECTION, SOLUTION INTRAMUSCULAR; INTRAVENOUS
Status: DISCONTINUED | OUTPATIENT
Start: 2017-01-01 | End: 2017-01-01 | Stop reason: HOSPADM

## 2017-01-01 RX ORDER — LORAZEPAM 2 MG/ML
1 INJECTION INTRAMUSCULAR
Status: DISCONTINUED | OUTPATIENT
Start: 2017-01-01 | End: 2017-01-01 | Stop reason: HOSPADM

## 2017-01-01 RX ORDER — MORPHINE SULFATE 10 MG/ML
10 INJECTION, SOLUTION INTRAMUSCULAR; INTRAVENOUS
Status: DISCONTINUED | OUTPATIENT
Start: 2017-01-01 | End: 2017-01-01 | Stop reason: HOSPADM

## 2017-01-01 RX ORDER — AMOXICILLIN 250 MG
2 CAPSULE ORAL 2 TIMES DAILY
Status: DISCONTINUED | OUTPATIENT
Start: 2017-01-01 | End: 2017-01-01

## 2017-01-01 RX ORDER — SODIUM CHLORIDE AND POTASSIUM CHLORIDE 300; 900 MG/100ML; MG/100ML
INJECTION, SOLUTION INTRAVENOUS CONTINUOUS
Status: DISCONTINUED | OUTPATIENT
Start: 2017-01-01 | End: 2017-01-01

## 2017-01-01 RX ORDER — POTASSIUM CHLORIDE 20 MEQ/1
20 TABLET, EXTENDED RELEASE ORAL 2 TIMES DAILY
Status: COMPLETED | OUTPATIENT
Start: 2017-01-01 | End: 2017-01-01

## 2017-01-01 RX ORDER — MAGNESIUM SULFATE HEPTAHYDRATE 40 MG/ML
2 INJECTION, SOLUTION INTRAVENOUS
Status: COMPLETED | OUTPATIENT
Start: 2017-01-01 | End: 2017-01-01

## 2017-01-01 RX ORDER — GLYCOPYRROLATE 1 MG/1
1 TABLET ORAL 3 TIMES DAILY PRN
Status: DISCONTINUED | OUTPATIENT
Start: 2017-01-01 | End: 2017-01-01 | Stop reason: HOSPADM

## 2017-01-01 RX ORDER — ONDANSETRON 8 MG/1
8 TABLET, ORALLY DISINTEGRATING ORAL EVERY 8 HOURS PRN
Status: DISCONTINUED | OUTPATIENT
Start: 2017-01-01 | End: 2017-01-01 | Stop reason: HOSPADM

## 2017-01-01 RX ORDER — ONDANSETRON 4 MG/1
4 TABLET, ORALLY DISINTEGRATING ORAL EVERY 4 HOURS PRN
Status: DISCONTINUED | OUTPATIENT
Start: 2017-01-01 | End: 2017-01-01 | Stop reason: HOSPADM

## 2017-01-01 RX ORDER — HYDRALAZINE HYDROCHLORIDE 20 MG/ML
10-20 INJECTION INTRAMUSCULAR; INTRAVENOUS EVERY 4 HOURS PRN
Status: DISCONTINUED | OUTPATIENT
Start: 2017-01-01 | End: 2017-01-01

## 2017-01-01 RX ORDER — POLYVINYL ALCOHOL 14 MG/ML
2 SOLUTION/ DROPS OPHTHALMIC EVERY 6 HOURS PRN
Status: DISCONTINUED | OUTPATIENT
Start: 2017-01-01 | End: 2017-01-01 | Stop reason: HOSPADM

## 2017-01-01 RX ORDER — LORAZEPAM 2 MG/ML
1 CONCENTRATE ORAL
Status: DISCONTINUED | OUTPATIENT
Start: 2017-01-01 | End: 2017-01-01 | Stop reason: HOSPADM

## 2017-01-01 RX ORDER — DEXTROSE MONOHYDRATE 50 MG/ML
500 INJECTION, SOLUTION INTRAVENOUS CONTINUOUS
Status: DISCONTINUED | OUTPATIENT
Start: 2017-01-01 | End: 2017-01-01

## 2017-01-01 RX ORDER — ACETAMINOPHEN 325 MG/1
650 TABLET ORAL EVERY 6 HOURS PRN
Status: DISCONTINUED | OUTPATIENT
Start: 2017-01-01 | End: 2017-01-01 | Stop reason: HOSPADM

## 2017-01-01 RX ORDER — SODIUM CHLORIDE 9 MG/ML
INJECTION, SOLUTION INTRAVENOUS CONTINUOUS
Status: DISCONTINUED | OUTPATIENT
Start: 2017-01-01 | End: 2017-01-01

## 2017-01-01 RX ORDER — MORPHINE SULFATE 4 MG/ML
2 INJECTION, SOLUTION INTRAMUSCULAR; INTRAVENOUS
Status: DISCONTINUED | OUTPATIENT
Start: 2017-01-01 | End: 2017-01-01

## 2017-01-01 RX ORDER — ENALAPRILAT 1.25 MG/ML
1.25 INJECTION INTRAVENOUS EVERY 6 HOURS PRN
Status: DISCONTINUED | OUTPATIENT
Start: 2017-01-01 | End: 2017-01-01

## 2017-01-01 RX ORDER — DEXTROSE MONOHYDRATE 50 MG/ML
INJECTION, SOLUTION INTRAVENOUS CONTINUOUS
Status: DISCONTINUED | OUTPATIENT
Start: 2017-01-01 | End: 2017-01-01

## 2017-01-01 RX ADMIN — DOXYCYCLINE 100 MG: 100 INJECTION, POWDER, LYOPHILIZED, FOR SOLUTION INTRAVENOUS at 20:00

## 2017-01-01 RX ADMIN — DEXTROSE MONOHYDRATE 500 ML: 50 INJECTION, SOLUTION INTRAVENOUS at 20:00

## 2017-01-01 RX ADMIN — SODIUM CHLORIDE: 9 INJECTION, SOLUTION INTRAVENOUS at 04:45

## 2017-01-01 RX ADMIN — AMIODARONE HYDROCHLORIDE 0.51 MG/MIN: 50 INJECTION, SOLUTION INTRAVENOUS at 11:41

## 2017-01-01 RX ADMIN — ENALAPRILAT 1.25 MG: 1.25 INJECTION, SOLUTION INTRAVENOUS at 10:13

## 2017-01-01 RX ADMIN — HALOPERIDOL LACTATE 5 MG: 5 INJECTION, SOLUTION INTRAMUSCULAR at 05:00

## 2017-01-01 RX ADMIN — HEPARIN SODIUM 5000 UNITS: 5000 INJECTION, SOLUTION INTRAVENOUS; SUBCUTANEOUS at 05:58

## 2017-01-01 RX ADMIN — DEXTROSE MONOHYDRATE: 50 INJECTION, SOLUTION INTRAVENOUS at 15:23

## 2017-01-01 RX ADMIN — SODIUM CHLORIDE: 9 INJECTION, SOLUTION INTRAVENOUS at 12:37

## 2017-01-01 RX ADMIN — HEPARIN SODIUM 5000 UNITS: 5000 INJECTION, SOLUTION INTRAVENOUS; SUBCUTANEOUS at 05:10

## 2017-01-01 RX ADMIN — STANDARDIZED SENNA CONCENTRATE AND DOCUSATE SODIUM 2 TABLET: 8.6; 5 TABLET, FILM COATED ORAL at 08:07

## 2017-01-01 RX ADMIN — MORPHINE SULFATE 2 MG: 4 INJECTION INTRAVENOUS at 02:58

## 2017-01-01 RX ADMIN — HEPARIN SODIUM 5000 UNITS: 5000 INJECTION, SOLUTION INTRAVENOUS; SUBCUTANEOUS at 05:22

## 2017-01-01 RX ADMIN — MORPHINE SULFATE 2 MG: 4 INJECTION INTRAVENOUS at 01:33

## 2017-01-01 RX ADMIN — HEPARIN SODIUM 5000 UNITS: 5000 INJECTION, SOLUTION INTRAVENOUS; SUBCUTANEOUS at 20:36

## 2017-01-01 RX ADMIN — MORPHINE SULFATE 2 MG: 4 INJECTION INTRAVENOUS at 10:39

## 2017-01-01 RX ADMIN — MORPHINE SULFATE 2 MG: 4 INJECTION INTRAVENOUS at 08:19

## 2017-01-01 RX ADMIN — AMIODARONE HYDROCHLORIDE 0.5 MG/MIN: 50 INJECTION, SOLUTION INTRAVENOUS at 13:20

## 2017-01-01 RX ADMIN — SODIUM CHLORIDE: 9 INJECTION, SOLUTION INTRAVENOUS at 05:07

## 2017-01-01 RX ADMIN — HALOPERIDOL LACTATE 5 MG: 5 INJECTION, SOLUTION INTRAMUSCULAR at 14:23

## 2017-01-01 RX ADMIN — HEPARIN SODIUM 5000 UNITS: 5000 INJECTION, SOLUTION INTRAVENOUS; SUBCUTANEOUS at 01:00

## 2017-01-01 RX ADMIN — POTASSIUM CHLORIDE AND SODIUM CHLORIDE: 900; 300 INJECTION, SOLUTION INTRAVENOUS at 14:07

## 2017-01-01 RX ADMIN — DOXYCYCLINE 100 MG: 100 INJECTION, POWDER, LYOPHILIZED, FOR SOLUTION INTRAVENOUS at 07:51

## 2017-01-01 RX ADMIN — DOXYCYCLINE 100 MG: 100 INJECTION, POWDER, LYOPHILIZED, FOR SOLUTION INTRAVENOUS at 21:47

## 2017-01-01 RX ADMIN — HEPARIN SODIUM 5000 UNITS: 5000 INJECTION, SOLUTION INTRAVENOUS; SUBCUTANEOUS at 13:41

## 2017-01-01 RX ADMIN — HEPARIN SODIUM 5000 UNITS: 5000 INJECTION, SOLUTION INTRAVENOUS; SUBCUTANEOUS at 21:23

## 2017-01-01 RX ADMIN — STANDARDIZED SENNA CONCENTRATE AND DOCUSATE SODIUM 2 TABLET: 8.6; 5 TABLET, FILM COATED ORAL at 21:23

## 2017-01-01 RX ADMIN — MORPHINE SULFATE 2 MG: 4 INJECTION INTRAVENOUS at 16:26

## 2017-01-01 RX ADMIN — MORPHINE SULFATE 2 MG: 4 INJECTION INTRAVENOUS at 21:45

## 2017-01-01 RX ADMIN — AMIODARONE HYDROCHLORIDE 0.5 MG/MIN: 50 INJECTION, SOLUTION INTRAVENOUS at 21:08

## 2017-01-01 RX ADMIN — DEXTROSE MONOHYDRATE: 50 INJECTION, SOLUTION INTRAVENOUS at 20:35

## 2017-01-01 RX ADMIN — MORPHINE SULFATE 2 MG: 4 INJECTION INTRAVENOUS at 08:36

## 2017-01-01 RX ADMIN — AMIODARONE HYDROCHLORIDE 0.5 MG/MIN: 50 INJECTION, SOLUTION INTRAVENOUS at 20:41

## 2017-01-01 RX ADMIN — MORPHINE SULFATE 2 MG: 4 INJECTION INTRAVENOUS at 18:21

## 2017-01-01 RX ADMIN — MORPHINE SULFATE 2 MG: 4 INJECTION INTRAVENOUS at 15:01

## 2017-01-01 RX ADMIN — POTASSIUM CHLORIDE 40 MEQ: 2 INJECTION, SOLUTION, CONCENTRATE INTRAVENOUS at 10:48

## 2017-01-01 RX ADMIN — CEFTRIAXONE 2 G: 2 INJECTION, POWDER, FOR SOLUTION INTRAMUSCULAR; INTRAVENOUS at 08:37

## 2017-01-01 RX ADMIN — DEXTROSE MONOHYDRATE 500 ML: 50 INJECTION, SOLUTION INTRAVENOUS at 01:23

## 2017-01-01 RX ADMIN — AMIODARONE HYDROCHLORIDE 0.5 MG/MIN: 50 INJECTION, SOLUTION INTRAVENOUS at 05:58

## 2017-01-01 RX ADMIN — MORPHINE SULFATE 2 MG: 4 INJECTION INTRAVENOUS at 10:11

## 2017-01-01 RX ADMIN — DEXTROSE MONOHYDRATE 500 ML: 50 INJECTION, SOLUTION INTRAVENOUS at 21:21

## 2017-01-01 RX ADMIN — STANDARDIZED SENNA CONCENTRATE AND DOCUSATE SODIUM 2 TABLET: 8.6; 5 TABLET, FILM COATED ORAL at 19:58

## 2017-01-01 RX ADMIN — STANDARDIZED SENNA CONCENTRATE AND DOCUSATE SODIUM 2 TABLET: 8.6; 5 TABLET, FILM COATED ORAL at 08:17

## 2017-01-01 RX ADMIN — SODIUM CHLORIDE: 9 INJECTION, SOLUTION INTRAVENOUS at 18:30

## 2017-01-01 RX ADMIN — HEPARIN SODIUM 5000 UNITS: 5000 INJECTION, SOLUTION INTRAVENOUS; SUBCUTANEOUS at 13:25

## 2017-01-01 RX ADMIN — MAGNESIUM SULFATE IN WATER 4 G: 40 INJECTION, SOLUTION INTRAVENOUS at 10:13

## 2017-01-01 RX ADMIN — HALOPERIDOL LACTATE 5 MG: 5 INJECTION, SOLUTION INTRAMUSCULAR at 15:27

## 2017-01-01 RX ADMIN — POTASSIUM CHLORIDE AND SODIUM CHLORIDE: 900; 300 INJECTION, SOLUTION INTRAVENOUS at 09:41

## 2017-01-01 RX ADMIN — POTASSIUM CHLORIDE: 2 INJECTION, SOLUTION, CONCENTRATE INTRAVENOUS at 10:43

## 2017-01-01 RX ADMIN — HEPARIN SODIUM 5000 UNITS: 5000 INJECTION, SOLUTION INTRAVENOUS; SUBCUTANEOUS at 22:28

## 2017-01-01 RX ADMIN — DOXYCYCLINE 100 MG: 100 INJECTION, POWDER, LYOPHILIZED, FOR SOLUTION INTRAVENOUS at 15:37

## 2017-01-01 RX ADMIN — AMIODARONE HYDROCHLORIDE 0.5 MG/MIN: 50 INJECTION, SOLUTION INTRAVENOUS at 20:35

## 2017-01-01 RX ADMIN — MORPHINE SULFATE 2 MG: 4 INJECTION INTRAVENOUS at 20:00

## 2017-01-01 RX ADMIN — CEFTRIAXONE 2 G: 2 INJECTION, POWDER, FOR SOLUTION INTRAMUSCULAR; INTRAVENOUS at 07:52

## 2017-01-01 RX ADMIN — POTASSIUM PHOSPHATE, MONOBASIC AND POTASSIUM PHOSPHATE, DIBASIC 30 MMOL: 224; 236 INJECTION, SOLUTION INTRAVENOUS at 11:25

## 2017-01-01 RX ADMIN — DOXYCYCLINE 100 MG: 100 INJECTION, POWDER, LYOPHILIZED, FOR SOLUTION INTRAVENOUS at 21:29

## 2017-01-01 RX ADMIN — SODIUM CHLORIDE: 9 INJECTION, SOLUTION INTRAVENOUS at 15:20

## 2017-01-01 RX ADMIN — HEPARIN SODIUM 5000 UNITS: 5000 INJECTION, SOLUTION INTRAVENOUS; SUBCUTANEOUS at 06:25

## 2017-01-01 RX ADMIN — HEPARIN SODIUM 5000 UNITS: 5000 INJECTION, SOLUTION INTRAVENOUS; SUBCUTANEOUS at 05:35

## 2017-01-01 RX ADMIN — HALOPERIDOL LACTATE 5 MG: 5 INJECTION, SOLUTION INTRAMUSCULAR at 10:29

## 2017-01-01 RX ADMIN — HALOPERIDOL LACTATE 5 MG: 5 INJECTION, SOLUTION INTRAMUSCULAR at 01:22

## 2017-01-01 RX ADMIN — AMIODARONE HYDROCHLORIDE 400 MG: 200 TABLET ORAL at 08:48

## 2017-01-01 RX ADMIN — DEXTROSE MONOHYDRATE 500 ML: 50 INJECTION, SOLUTION INTRAVENOUS at 14:12

## 2017-01-01 RX ADMIN — MORPHINE SULFATE 2 MG: 4 INJECTION INTRAVENOUS at 14:00

## 2017-01-01 RX ADMIN — SODIUM CHLORIDE: 9 INJECTION, SOLUTION INTRAVENOUS at 22:26

## 2017-01-01 RX ADMIN — AMIODARONE HYDROCHLORIDE 0.5 MG/MIN: 50 INJECTION, SOLUTION INTRAVENOUS at 04:11

## 2017-01-01 RX ADMIN — STANDARDIZED SENNA CONCENTRATE AND DOCUSATE SODIUM 2 TABLET: 8.6; 5 TABLET, FILM COATED ORAL at 20:41

## 2017-01-01 RX ADMIN — DEXTROSE MONOHYDRATE 500 ML: 50 INJECTION, SOLUTION INTRAVENOUS at 03:33

## 2017-01-01 RX ADMIN — AMIODARONE HYDROCHLORIDE 0.5 MG/MIN: 50 INJECTION, SOLUTION INTRAVENOUS at 15:24

## 2017-01-01 RX ADMIN — MAGNESIUM SULFATE IN WATER 2 G: 40 INJECTION, SOLUTION INTRAVENOUS at 13:39

## 2017-01-01 RX ADMIN — MORPHINE SULFATE 2 MG: 4 INJECTION INTRAVENOUS at 03:15

## 2017-01-01 RX ADMIN — POTASSIUM CHLORIDE 20 MEQ: 1500 TABLET, EXTENDED RELEASE ORAL at 08:07

## 2017-01-01 RX ADMIN — POTASSIUM CHLORIDE AND SODIUM CHLORIDE: 900; 300 INJECTION, SOLUTION INTRAVENOUS at 09:31

## 2017-01-01 RX ADMIN — HEPARIN SODIUM 5000 UNITS: 5000 INJECTION, SOLUTION INTRAVENOUS; SUBCUTANEOUS at 13:02

## 2017-01-01 RX ADMIN — HALOPERIDOL LACTATE 5 MG: 5 INJECTION, SOLUTION INTRAMUSCULAR at 19:30

## 2017-01-01 RX ADMIN — POTASSIUM CHLORIDE AND SODIUM CHLORIDE: 900; 300 INJECTION, SOLUTION INTRAVENOUS at 14:04

## 2017-01-01 RX ADMIN — AMIODARONE HYDROCHLORIDE 1 MG/MIN: 50 INJECTION, SOLUTION INTRAVENOUS at 04:00

## 2017-01-01 RX ADMIN — AMIODARONE HYDROCHLORIDE 0.5 MG/MIN: 50 INJECTION, SOLUTION INTRAVENOUS at 03:10

## 2017-01-01 RX ADMIN — POTASSIUM CHLORIDE 20 MEQ: 1500 TABLET, EXTENDED RELEASE ORAL at 19:58

## 2017-01-01 RX ADMIN — MORPHINE SULFATE 2 MG: 4 INJECTION INTRAVENOUS at 19:30

## 2017-01-01 RX ADMIN — CEFTRIAXONE 2 G: 2 INJECTION, POWDER, FOR SOLUTION INTRAMUSCULAR; INTRAVENOUS at 15:38

## 2017-01-01 RX ADMIN — DOXYCYCLINE 100 MG: 100 INJECTION, POWDER, LYOPHILIZED, FOR SOLUTION INTRAVENOUS at 08:17

## 2017-01-01 RX ADMIN — HALOPERIDOL LACTATE 5 MG: 5 INJECTION, SOLUTION INTRAMUSCULAR at 03:09

## 2017-01-01 RX ADMIN — HALOPERIDOL LACTATE 5 MG: 5 INJECTION, SOLUTION INTRAMUSCULAR at 01:15

## 2017-01-01 RX ADMIN — MORPHINE SULFATE 2 MG: 4 INJECTION INTRAVENOUS at 13:50

## 2017-01-01 RX ADMIN — CEFTRIAXONE 2 G: 2 INJECTION, POWDER, FOR SOLUTION INTRAMUSCULAR; INTRAVENOUS at 08:17

## 2017-01-01 RX ADMIN — STANDARDIZED SENNA CONCENTRATE AND DOCUSATE SODIUM 2 TABLET: 8.6; 5 TABLET, FILM COATED ORAL at 21:29

## 2017-01-01 RX ADMIN — HEPARIN SODIUM 5000 UNITS: 5000 INJECTION, SOLUTION INTRAVENOUS; SUBCUTANEOUS at 14:06

## 2017-01-01 RX ADMIN — HEPARIN SODIUM 5000 UNITS: 5000 INJECTION, SOLUTION INTRAVENOUS; SUBCUTANEOUS at 14:54

## 2017-01-01 RX ADMIN — POTASSIUM CHLORIDE 10 MEQ: 2 INJECTION, SOLUTION, CONCENTRATE INTRAVENOUS at 16:05

## 2017-01-01 RX ADMIN — SODIUM CHLORIDE: 9 INJECTION, SOLUTION INTRAVENOUS at 18:08

## 2017-01-01 RX ADMIN — AMIODARONE HYDROCHLORIDE 400 MG: 200 TABLET ORAL at 21:23

## 2017-01-01 RX ADMIN — HYDRALAZINE HYDROCHLORIDE 20 MG: 20 INJECTION INTRAMUSCULAR; INTRAVENOUS at 23:18

## 2017-01-01 RX ADMIN — DOXYCYCLINE 100 MG: 100 INJECTION, POWDER, LYOPHILIZED, FOR SOLUTION INTRAVENOUS at 08:30

## 2017-01-01 RX ADMIN — HEPARIN SODIUM 5000 UNITS: 5000 INJECTION, SOLUTION INTRAVENOUS; SUBCUTANEOUS at 21:28

## 2017-01-01 RX ADMIN — MORPHINE SULFATE 2 MG: 4 INJECTION INTRAVENOUS at 14:13

## 2017-01-01 RX ADMIN — HEPARIN SODIUM 5000 UNITS: 5000 INJECTION, SOLUTION INTRAVENOUS; SUBCUTANEOUS at 21:06

## 2017-01-01 RX ADMIN — MAGNESIUM SULFATE IN WATER 2 G: 40 INJECTION, SOLUTION INTRAVENOUS at 11:37

## 2017-01-01 RX ADMIN — DEXTROSE MONOHYDRATE 500 ML: 50 INJECTION, SOLUTION INTRAVENOUS at 05:35

## 2017-01-01 RX ADMIN — HEPARIN SODIUM 5000 UNITS: 5000 INJECTION, SOLUTION INTRAVENOUS; SUBCUTANEOUS at 06:00

## 2017-01-01 RX ADMIN — MORPHINE SULFATE 2 MG: 4 INJECTION INTRAVENOUS at 10:30

## 2017-01-01 RX ADMIN — STANDARDIZED SENNA CONCENTRATE AND DOCUSATE SODIUM 2 TABLET: 8.6; 5 TABLET, FILM COATED ORAL at 07:52

## 2017-01-01 RX ADMIN — MORPHINE SULFATE 2 MG: 4 INJECTION INTRAVENOUS at 11:54

## 2017-01-01 RX ADMIN — POTASSIUM CHLORIDE 20 MEQ: 2 INJECTION, SOLUTION, CONCENTRATE INTRAVENOUS at 12:42

## 2017-01-01 RX ADMIN — POTASSIUM CHLORIDE AND SODIUM CHLORIDE: 900; 300 INJECTION, SOLUTION INTRAVENOUS at 05:10

## 2017-01-01 RX ADMIN — HEPARIN SODIUM 5000 UNITS: 5000 INJECTION, SOLUTION INTRAVENOUS; SUBCUTANEOUS at 14:12

## 2017-01-01 RX ADMIN — SODIUM CHLORIDE: 9 INJECTION, SOLUTION INTRAVENOUS at 08:07

## 2017-01-01 RX ADMIN — IOHEXOL 75 ML: 350 INJECTION, SOLUTION INTRAVENOUS at 02:22

## 2017-01-01 RX ADMIN — DEXTROSE MONOHYDRATE 500 ML: 50 INJECTION, SOLUTION INTRAVENOUS at 11:59

## 2017-01-01 RX ADMIN — SODIUM CHLORIDE: 9 INJECTION, SOLUTION INTRAVENOUS at 05:57

## 2017-01-01 RX ADMIN — MAGNESIUM SULFATE IN WATER 4 G: 40 INJECTION, SOLUTION INTRAVENOUS at 10:04

## 2017-01-01 RX ADMIN — ENALAPRILAT 1.25 MG: 1.25 INJECTION, SOLUTION INTRAVENOUS at 20:35

## 2017-01-01 RX ADMIN — STANDARDIZED SENNA CONCENTRATE AND DOCUSATE SODIUM 2 TABLET: 8.6; 5 TABLET, FILM COATED ORAL at 20:00

## 2017-01-01 RX ADMIN — AMIODARONE HYDROCHLORIDE 150 MG: 1.5 INJECTION, SOLUTION INTRAVENOUS at 03:32

## 2017-01-01 RX ADMIN — HALOPERIDOL LACTATE 5 MG: 5 INJECTION, SOLUTION INTRAMUSCULAR at 20:00

## 2017-01-01 RX ADMIN — CEFTRIAXONE 2 G: 2 INJECTION, POWDER, FOR SOLUTION INTRAMUSCULAR; INTRAVENOUS at 08:07

## 2017-01-01 RX ADMIN — AMIODARONE HYDROCHLORIDE 0.5 MG/MIN: 50 INJECTION, SOLUTION INTRAVENOUS at 11:57

## 2017-01-01 SDOH — ECONOMIC STABILITY: HOUSING INSECURITY: UNSAFE COOKING RANGE AREA: 0

## 2017-01-01 SDOH — ECONOMIC STABILITY: GENERAL

## 2017-01-01 SDOH — ECONOMIC STABILITY: HOUSING INSECURITY: UNSAFE APPLIANCES: 0

## 2017-01-01 SDOH — ECONOMIC STABILITY: HOUSING INSECURITY
HOME SAFETY: FAMILY HAS POSSESSION OF O2 SIGN, APARTMENT MANAGER NEEDS TO BE CONTACTED TO APPROVE POSTING. FAMILY WILL F/U ON MONDAY.

## 2017-01-01 ASSESSMENT — ENCOUNTER SYMPTOMS
CHILLS: 0
ABDOMINAL PAIN: 0
SPUTUM COLOR: TAN
SORE THROAT: 1
COUGH CHARACTERISTICS: STRONG
BACK PAIN: 0
SHORTNESS OF BREATH: 0
FATIGUES EASILY: 1
COUGH CHARACTERISTICS: MOIST
ABDOMINAL PAIN: 0
COUGH: 1
CHILLS: 0
COUGH: 1
SPUTUM AMOUNT: COPIOUS
SPUTUM COLOR: TAN
DYSPNEA ACTIVITY LEVEL: AT REST
BACK PAIN: 0
CONSTIPATION: 1
SHORTNESS OF BREATH: 1
COUGH CHARACTERISTICS: MOIST
WEIGHT LOSS: 0
LAST BOWEL MOVEMENT: 64634
PND: 0
DEPRESSION: 0
SHORTNESS OF BREATH: 0
COUGH CHARACTERISTICS: PRODUCTIVE
SPUTUM PRODUCTION: 1
DIZZINESS: 0
COUGH CHARACTERISTICS: PRODUCTIVE
SPUTUM CONSISTENCY: THICK
FEVER: 0
ABDOMINAL PAIN: 0
COUGH: 1
COUGH CHARACTERISTICS: PRODUCTIVE
SPUTUM AMOUNT: COPIOUS
COUGH CHARACTERISTICS: PRODUCTIVE
WEAKNESS: 0
COUGH: 1
DIZZINESS: 0
STOOL FREQUENCY: LESS THAN DAILY
COUGH: 1
FEVER: 0
SLEEP QUALITY: FAIR
SLEEP QUALITY: ADEQUATE
SPUTUM CONSISTENCY: THICK
SPUTUM PRODUCTION: 1
COUGH: 1
SHORTNESS OF BREATH: 0
SORE THROAT: 1
COUGH CHARACTERISTICS: PRODUCTIVE
SPUTUM COLOR: TAN
COUGH CHARACTERISTICS: PRODUCTIVE
PALPITATIONS: 0
COUGH CHARACTERISTICS: PRODUCTIVE
FEVER: 0
PALPITATIONS: 0
SPUTUM CONSISTENCY: TENACIOUS
DYSPNEA ON EXERTION: 1
FATIGUE: 1
FLANK PAIN: 0
SPUTUM PRODUCTION: 1
NERVOUS/ANXIOUS: 0
NERVOUS/ANXIOUS: 0
SENSORY CHANGE: 0
SORE THROAT: 1
COUGH CHARACTERISTICS: MOIST
SLEEP QUALITY: ADEQUATE
BACK PAIN: 0
COUGH CHARACTERISTICS: MOIST
MYALGIAS: 0
COUGH: 1
PALPITATIONS: 0
FEVER: 0
DIZZINESS: 0
MYALGIAS: 0
SPUTUM CONSISTENCY: THIN
SPUTUM PRODUCTION: 1
SOMNOLENCE: 1
STRIDOR: 1
SPUTUM COLOR: PINK
COUGH CHARACTERISTICS: CONGESTED
DRY SKIN: 1
CLAUDICATION: 0
SPEECH CHANGE: 0
SPUTUM COLOR: TAN
SORE THROAT: 1
ABDOMINAL PAIN: 0
LAST BOWEL MOVEMENT: 64641
COUGH CHARACTERISTICS: STRONG
COUGH CHARACTERISTICS: PRODUCTIVE
SPUTUM CONSISTENCY: THICK
FOCAL WEAKNESS: 0
BACK PAIN: 0
DIAPHORESIS: 0
WEAKNESS: 1
CONSTIPATION: 1
SORE THROAT: 1
SHORTNESS OF BREATH: 0
SPUTUM AMOUNT: COPIOUS
NAUSEA: 0
NERVOUS/ANXIOUS: 0
PALPITATIONS: 0
SPUTUM CONSISTENCY: THICK
COUGH CHARACTERISTICS: MOIST
BOWEL PATTERN NORMAL: 1
SORE THROAT: 1
SHORTNESS OF BREATH: 0
SPUTUM COLOR: TAN
FEVER: 0
NERVOUS/ANXIOUS: 0
SHORTNESS OF BREATH: 0
STOOL FREQUENCY: LESS THAN DAILY
LAST BOWEL MOVEMENT: 64630
NAUSEA: 0
BOWEL PATTERN NORMAL: 1
FATIGUES EASILY: 1
NERVOUS/ANXIOUS: 0
CONTUSION: 1
ABDOMINAL PAIN: 0
SPUTUM PRODUCTION: 1
NAUSEA: 0
COUGH: 1
DIZZINESS: 0
STOOL FREQUENCY: DAILY
BOWEL PATTERN NORMAL: 1
ABDOMINAL PAIN: 0
NAUSEA: 0
ABDOMINAL PAIN: 0
BACK PAIN: 0
FEVER: 0
COUGH CHARACTERISTICS: MOIST
COUGH: 0
SHORTNESS OF BREATH: 0
BACK PAIN: 0
BOWEL PATTERN NORMAL: 1
PALPITATIONS: 0
PALPITATIONS: 0
SORE THROAT: 1
NAUSEA: 0
COUGH CHARACTERISTICS: STRONG
BACK PAIN: 0
SLEEP QUALITY: ADEQUATE
HEADACHES: 0
SPUTUM AMOUNT: COPIOUS
CONSTIPATION: 1
MEMORY LOSS: 0
WHEEZING: 0
PALPITATIONS: 0
SLEEP QUALITY: ADEQUATE
NAUSEA: 0
COUGH CHARACTERISTICS: STRONG
FEVER: 0
LAST BOWEL MOVEMENT: 64641
SLEEP QUALITY: FAIR
COUGH CHARACTERISTICS: PRODUCTIVE
NERVOUS/ANXIOUS: 0
COUGH: 1
COUGH: 1
LOWER EXTREMITY EDEMA: 1
DIZZINESS: 0
COUGH CHARACTERISTICS: STRONG
DIZZINESS: 0
DIZZINESS: 0
NERVOUS/ANXIOUS: 0
COUGH: 1
FEVER: 0
SHORTNESS OF BREATH: 1
CONTUSION: 1
COUGH: 1
SPUTUM PRODUCTION: 1
COUGH CHARACTERISTICS: STRONG
NAUSEA: 0
SPUTUM CONSISTENCY: THICK
SPUTUM AMOUNT: COPIOUS
VOMITING: 0
ORTHOPNEA: 0
COUGH CHARACTERISTICS: STRONG
BACK PAIN: 0
DRY SKIN: 1
COUGH CHARACTERISTICS: CONGESTED
FATIGUES EASILY: 1
SPUTUM PRODUCTION: 1
COUGH CHARACTERISTICS: PRODUCTIVE
COUGH CHARACTERISTICS: CONGESTED
LAST BOWEL MOVEMENT: 64634
SPUTUM PRODUCTION: 0
SPUTUM AMOUNT: MODERATE
SPUTUM COLOR: TAN
PALPITATIONS: 0
SPUTUM AMOUNT: MODERATE
NAUSEA: 0
DIZZINESS: 0
WEIGHT LOSS: 1
NERVOUS/ANXIOUS: 0
ABDOMINAL PAIN: 0

## 2017-01-01 ASSESSMENT — SOCIAL DETERMINANTS OF HEALTH (SDOH)
ACTIVE STRESSOR - NO STRESS FACTORS: 1
ACTIVE STRESSOR - LOSS OF CONTROL: 1
ACTIVE STRESSOR - NO STRESS FACTORS: 1
ACTIVE STRESSOR - HEALTH CHANGES: 1
ACTIVE STRESSOR - LOSS OF CONTROL: 1
ACTIVE STRESSOR - EXPRESSED EMOTIONAL NEED: 1
FINANCIAL_CONCERNS: 1
EXPRESSED_FINANCIAL_NEED: 1
ACTIVE STRESSOR - LOSS OF CONTROL: 1
ACTIVE STRESSOR - EXHAUSTION: 1
ACTIVE STRESSOR - HEALTH CHANGES: 1
ACTIVE STRESSOR - NO STRESS FACTORS: 1
ACTIVE STRESSOR - EXPRESSED EMOTIONAL NEED: 1
ACTIVE STRESSOR - HEALTH CHANGES: 1
ACTIVE STRESSOR - HEALTH CHANGES: 1
ACTIVE STRESSOR - LACK OF CAREGIVERS: 1
ACTIVE STRESSOR - HEALTH CHANGES: 1

## 2017-01-01 ASSESSMENT — PAIN SCALES - GENERAL
PAINLEVEL_OUTOF10: 0
PAINLEVEL_OUTOF10: 0
PAINLEVEL_OUTOF10: 5
PAINLEVEL_OUTOF10: 7
PAINLEVEL_OUTOF10: 0
PAINLEVEL_OUTOF10: 3
PAINLEVEL_OUTOF10: 0
PAINLEVEL_OUTOF10: 0
PAINLEVEL_OUTOF10: 3
PAINLEVEL_OUTOF10: 0
PAINLEVEL_OUTOF10: 3
PAINLEVEL_OUTOF10: 3
PAINLEVEL_OUTOF10: 4
PAINLEVEL_OUTOF10: 3
PAINLEVEL_OUTOF10: 0
PAINLEVEL_OUTOF10: 6
PAINLEVEL_OUTOF10: 0
PAINLEVEL_OUTOF10: 0
PAINLEVEL_OUTOF10: 5
PAINLEVEL_OUTOF10: 0
PAINLEVEL_OUTOF10: 0
PAINLEVEL_OUTOF10: 3
PAINLEVEL_OUTOF10: 0
PAINLEVEL_OUTOF10: 5
PAINLEVEL_OUTOF10: 3
PAINLEVEL_OUTOF10: 0
PAINLEVEL_OUTOF10: 3
PAINLEVEL_OUTOF10: 0
PAINLEVEL_OUTOF10: 9
PAINLEVEL_OUTOF10: 0
PAINLEVEL_OUTOF10: 3
PAINLEVEL_OUTOF10: 5
PAINLEVEL_OUTOF10: 3
PAINLEVEL_OUTOF10: 3
PAINLEVEL_OUTOF10: 0
PAINLEVEL_OUTOF10: 8
PAINLEVEL_OUTOF10: 3
PAINLEVEL_OUTOF10: 3

## 2017-01-01 ASSESSMENT — LIFESTYLE VARIABLES
ALCOHOL_USE: NO
DO YOU DRINK ALCOHOL: NO
DO YOU DRINK ALCOHOL: NO
EVER_SMOKED: YES
EVER_SMOKED: YES

## 2017-01-01 ASSESSMENT — ACTIVITIES OF DAILY LIVING (ADL)
MONEY MANAGEMENT (EXPENSES/BILLS): TOTALLY DEPENDENT
DRESSING_REQUIRES_ASSISTANCE: 1
BATHING_REQUIRES_ASSISTANCE: 1
TOILETING: UNABLE TO DETERMINE AT THIS TIME
CONTINENCE_REQUIRES_ASSISTANCE: 1
AMBULATION_REQUIRES_ASSISTANCE: 1
PHYSICAL_TRANSFER_REQUIRES_ASSISTANCE: 1
MONEY MANAGEMENT (EXPENSES/BILLS): TOTALLY DEPENDENT
MONEY MANAGEMENT (EXPENSES/BILLS): TOTALLY DEPENDENT

## 2017-01-01 ASSESSMENT — COGNITIVE AND FUNCTIONAL STATUS - GENERAL
EATING MEALS: TOTAL
SUGGESTED CMS G CODE MODIFIER MOBILITY: CN
MOVING FROM LYING ON BACK TO SITTING ON SIDE OF FLAT BED: UNABLE
TURNING FROM BACK TO SIDE WHILE IN FLAT BAD: A LOT
SUGGESTED CMS G CODE MODIFIER DAILY ACTIVITY: CN
CLIMB 3 TO 5 STEPS WITH RAILING: TOTAL
WALKING IN HOSPITAL ROOM: A LOT
CLIMB 3 TO 5 STEPS WITH RAILING: TOTAL
STANDING UP FROM CHAIR USING ARMS: TOTAL
DAILY ACTIVITIY SCORE: 6
DRESSING REGULAR LOWER BODY CLOTHING: A LOT
MOVING TO AND FROM BED TO CHAIR: UNABLE
HELP NEEDED FOR BATHING: TOTAL
HELP NEEDED FOR BATHING: A LOT
TOILETING: TOTAL
PERSONAL GROOMING: TOTAL
SUGGESTED CMS G CODE MODIFIER MOBILITY: CL
TURNING FROM BACK TO SIDE WHILE IN FLAT BAD: UNABLE
STANDING UP FROM CHAIR USING ARMS: A LOT
EATING MEALS: TOTAL
DRESSING REGULAR UPPER BODY CLOTHING: TOTAL
PERSONAL GROOMING: A LOT
DRESSING REGULAR UPPER BODY CLOTHING: A LOT
SUGGESTED CMS G CODE MODIFIER DAILY ACTIVITY: CL
MOBILITY SCORE: 11
TOILETING: TOTAL
WALKING IN HOSPITAL ROOM: TOTAL
MOVING TO AND FROM BED TO CHAIR: A LOT
DAILY ACTIVITIY SCORE: 10
DRESSING REGULAR LOWER BODY CLOTHING: TOTAL
MOVING FROM LYING ON BACK TO SITTING ON SIDE OF FLAT BED: A LOT
MOBILITY SCORE: 6

## 2017-01-01 ASSESSMENT — PATIENT HEALTH QUESTIONNAIRE - PHQ9
SUM OF ALL RESPONSES TO PHQ9 QUESTIONS 1 AND 2: 0
SUM OF ALL RESPONSES TO PHQ QUESTIONS 1-9: 0
SUM OF ALL RESPONSES TO PHQ QUESTIONS 1-9: 0
SUM OF ALL RESPONSES TO PHQ9 QUESTIONS 1 AND 2: 0
1. LITTLE INTEREST OR PLEASURE IN DOING THINGS: NOT AT ALL
2. FEELING DOWN, DEPRESSED, IRRITABLE, OR HOPELESS: NOT AT ALL
SUM OF ALL RESPONSES TO PHQ9 QUESTIONS 1 AND 2: 0
1. LITTLE INTEREST OR PLEASURE IN DOING THINGS: NOT AT ALL
SUM OF ALL RESPONSES TO PHQ QUESTIONS 1-9: 0
1. LITTLE INTEREST OR PLEASURE IN DOING THINGS: NOT AT ALL
2. FEELING DOWN, DEPRESSED, IRRITABLE, OR HOPELESS: NOT AT ALL
2. FEELING DOWN, DEPRESSED, IRRITABLE, OR HOPELESS: NOT AT ALL

## 2017-01-01 ASSESSMENT — COPD QUESTIONNAIRES
DO YOU EVER COUGH UP ANY MUCUS OR PHLEGM?: NO/ONLY WITH OCCASIONAL COLDS OR INFECTIONS
HAVE YOU SMOKED AT LEAST 100 CIGARETTES IN YOUR ENTIRE LIFE: YES
COPD SCREENING SCORE: 6
DURING THE PAST 4 WEEKS HOW MUCH DID YOU FEEL SHORT OF BREATH: MOST  OR ALL OF THE TIME

## 2017-01-01 ASSESSMENT — GAIT ASSESSMENTS
DISTANCE (FEET): 25
GAIT LEVEL OF ASSIST: MODERATE ASSIST
DEVIATION: DECREASED BASE OF SUPPORT;ATAXIC
ASSISTIVE DEVICE: FRONT WHEEL WALKER

## 2017-12-01 PROBLEM — R06.1 STRIDOR: Status: ACTIVE | Noted: 2017-01-01

## 2017-12-01 PROBLEM — J38.3 VOCAL CORD MASS: Status: ACTIVE | Noted: 2017-01-01

## 2017-12-01 PROBLEM — J18.9 COMMUNITY ACQUIRED PNEUMONIA OF RIGHT LUNG: Status: ACTIVE | Noted: 2017-01-01

## 2017-12-01 NOTE — PROGRESS NOTES
"Pt arrived to floor with RN via at 1135. Monitor applied, Patient oriented to room and use of call light. Initial assessment completed.Patient A&Ox4, with girggly/horase voice, but no other signs of distress noted, pt states he is \"feeling good\". Mild complaints of pain in his throat at this time.   "

## 2017-12-01 NOTE — ED NOTES
"Justin Morales Sr.  85 y.o.  Chief Complaint   Patient presents with   • Neck Swelling     Per family member, pt. was sent by MD for issues related to throat swelling. Pt's MD Dr. Coles would like pt. to be admitted for observation overnight tonight prior to surgery scheduled to take place tomorrow at 6:30pm.      /63   Pulse 70   Temp 36.5 °C (97.7 °F)   Resp 16   Ht 1.676 m (5' 6\")   Wt 43.2 kg (95 lb 3.8 oz)   SpO2 100%   BMI 15.37 kg/m²     Pt. States \"really bad\" difficulty breathing with his throat pain and swelling at this time. Pt. Is not having any obvious respiratory distress at this time. Pt. Is able to manage her secretions. Pt. Returned to ER senior lounge and instructed to inform the triage RN of any new or worsening symptoms. Charge RN notified.  "

## 2017-12-01 NOTE — H&P
DATE OF ADMISSION:  12/01/2017    PATIENT'S ENT:  Dr. Jung and Dr. Coles.    CHIEF COMPLAINT:  He was sent here from ENT clinic due to vocal cord mass.    HISTORY OF PRESENT ILLNESS:  Patient is a very pleasant 85-year-old male who   has no significant past medical history, was sent here from ENT due to vocal   cord mass and aspiration.  The patient is hard of hearing, so some of the   history has been obtained from his son at bedside.  They report that over the   past month, patient has had hoarse voice and evidence of aspiration.  He has   been seen by Dr. Jung and then Dr. Coles.  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  Patient denies   any weight loss and has no complaints at this time.    REVIEW OF SYSTEMS:  As per HPI.  All other systems have been reviewed and are   negative.    ALLERGIES:  No known drug allergies.    PAST MEDICAL HISTORY:  None.    PAST SURGICAL HISTORY:  None.    FAMILY HISTORY:  Has been reviewed and is not pertinent to his current   presentation.    HOME MEDICATIONS:  None.    SOCIAL HISTORY:  Smokes half a pack to 1 pack per day.  Denies alcohol or drug   use.    PHYSICAL EXAMINATION:  VITAL SIGNS:  Blood pressure is 148/63, pulse is 62, respirations 20,   temperature 36.5, oxygen saturation 98% on room air and weight 43.2 kilograms.  GENERAL:  Patient is cachectic, chronically ill-appearing, but he is pleasant   and not in any acute distress.  HEENT:  Dry mucous membranes.  No oropharyngeal exudates.  Eyes, EOMI, PERRLA.  NECK:  No lymphadenopathy, no JVD.  CARDIOVASCULAR:  Regular rate and rhythm.  No murmurs.  LUNGS:  Clear to auscultation bilaterally.  No rales or rhonchi.  ABDOMEN:  Positive bowel sounds, soft, nontender, nondistended.  EXTREMITIES:  No clubbing, cyanosis, or edema.  NEUROLOGIC:  Awake, alert, and oriented to person, place, time, and  situation.    His voice is hoarse.    LABORATORY DATA:  WBC 11.1, hemoglobin 13.2, hematocrit 39.8 and platelets   473.  Sodium 138, potassium 3.9, BUN is 21 and creatinine is 0.71.    IMAGING:  CT chest and soft tissue neck have been ordered and are currently   pending.    ASSESSMENT AND PLAN:  Patient is an 85-year-old male that was sent to the   emergency department from ENT due to vocal cord mass and aspiration.  1.  Vocal cord tumors.  We will defer further management to ENT.  Dr. Coles   will evaluate the patient tomorrow.  A CAT scan of his neck as well as his   head had been ordered as well as chest.  We will make him n.p.o. and place him   on aspiration precautions.  We will start him on IV fluids.  2.  Dysphagia.  3.  Dysphonia.  4.  He is a full code.  5.  Ongoing tobacco dependence.       ____________________________________     MD SANDY Felton / EMBER    DD:  12/01/2017 02:09:16  DT:  12/01/2017 03:37:48    D#:  [id number]  Job#:  [id number]

## 2017-12-01 NOTE — PROGRESS NOTES
Pt transported from [facility] by primary RN and CNA. No acute s/s of distress noted. Pt placed on heart monitor. Oriented pt to unit and call light system. No needs voiced. Bed in lowest position and locked. Belongings and call light in reach. Safety maintained, will continue to monitor.

## 2017-12-01 NOTE — CARE PLAN
Problem: Nutritional:  Goal: Achieve adequate nutritional intake  Patient will advance diet and consume >50% of meals  Outcome: NOT MET

## 2017-12-01 NOTE — PROGRESS NOTES
CRITICAL CARE MEDICINE   BRIEF PROGRESS NOTE    Date of service: 12/1/2017  Time: 4:45 AM      I was asked by Dr. Chapman of the hospitalist service about this patient possibly being transferred to the intensive care unit due to a large laryngeal mass which measures approximately 5.4 by 4 x 4 centimeters in the upper thorax lower cervical region. Patient has on CT scan tumor that extends and compresses the distal trachea. Vocal cords appeared to be involved however I was able to visualize the tongue in the floor of the mouth and the hypopharynx. There appear to be fairly clear upper airway. More importantly the distal laryngeal mass is causing compression. The patient had a hoarseness but is currently in no distress and saturation is 90%. No significant infiltrates on CT scan are noted.    There was recommendation by the radiologist that the patient comes intensive care unit however there is some discussion the patient and the patient's family may not pursue surgical intervention as he is a 85-year-old male and surgery would be extensive. The ENT physicians will be notified by Dr. Chapman early this morning and at this point we don't have a ICU bed for the patient at this time. I have made the rapid response team aware of this as he would be a difficult airway and an emergency tracheostomy where should be possibly more difficult based on the level of the mass. I will remain available to the hospitalist service and the ENT service though through the remainder of the early morning. Please call if needed additional assistance.    Manish Gonzalez D.O.

## 2017-12-01 NOTE — PROGRESS NOTES
Patient transferred to ICU T606 at this time with ACLS RN for higher level of care. This RN attempted to call and update Family, listed number does not work at this time.

## 2017-12-01 NOTE — PROGRESS NOTES
Assumed care of patient. Assessment complete. Patient cmplains of some throat pain at this time.  Educated to use call light for assistance. Fall precautions in place. Tele box in place. Continuous pulse ox in use.  Will continue to monitor with hourly rounding.

## 2017-12-01 NOTE — ED PROVIDER NOTES
ED Provider Note    Scribed for Benitez Guerra M.D. by Radha Fatima. 11/30/2017, 11:35 PM.    Primary care provider: No primary care provider on file.  Means of arrival: Walk-In  History obtained from: Patient, patient's son.  History limited by: None    CHIEF COMPLAINT  Chief Complaint   Patient presents with   • Neck Swelling     Per family member, pt. was sent by MD for issues related to throat swelling. Pt's MD Dr. Coles would like pt. to be admitted for observation overnight tonight prior to surgery scheduled to take place tomorrow at 6:30pm.        HPI  Justin Morales Sr. is a 85 y.o. male with a history of smoking who presents to the Emergency Department after being sent by his MD, Dr. Coles, for issues related to throat swelling with associated shortness of breath. Per nursing note, patient was sent by Dr. Coles for overnight admission and would like a CT neck and chest with contrast tonight prior to having surgery tomorrow night here at [facility] at 6:30 PM. Patient is accompanied by his son, who reports that Dr. Coles performed an endoscopy procedure this morning and is concerned for tumors in his throat. Son also notes a barium swallow approximately 1 month ago which patient was unable to complete due to exasperation and a chest x-ray done at [facility]. Additionally, patient reports difficulty sleeping feeling like his throat is closing and choking him and will wake up sweating.    REVIEW OF SYSTEMS - C  Patient denies fever, vision change, sore throat, chest pain, nausea, dysuria, focal muscle pain, rashes, or neurologic deficits. Positive for shortness of breath, throat swelling.    PAST MEDICAL HISTORY    No past medical history on file.    SURGICAL HISTORY   has a past surgical history that includes cataract extraction.    SOCIAL HISTORY  Social History   Substance Use Topics   • Smoking status: Current Every Day Smoker     Packs/day: 1.50     Years: 60.00     Types: Cigarettes   •  "Smokeless tobacco:       Comment: 1 ppd X many years   • Alcohol use No      History   Drug Use No       FAMILY HISTORY  No family history on file.    CURRENT MEDICATIONS  Reviewed.  See Encounter Summary.     ALLERGIES  No Known Allergies    PHYSICAL EXAM  VITAL SIGNS: /63   Pulse 70   Temp 36.5 °C (97.7 °F)   Resp 16   Ht 1.676 m (5' 6\")   Wt 43.2 kg (95 lb 3.8 oz)   SpO2 100%   BMI 15.37 kg/m²      Pulse ox interpretation: I interpret this pulse ox as normal.  Constitutional: Elderly. Alert in no apparent distress.  HENT: oral pharynx is clear. Head normocephalic, atraumatic, Bilateral external ears normal, Nose normal.  Eyes: Pupils are equal, extraocular movements intact, Conjunctiva normal, Non-icteric.   Neck: Normal range of motion, Supple.  Cardiovascular: Regular rate and rhythm. No rubs, murmurs, or gallops.   Thorax & Lungs: Raspy stridorous chronic breath sounds but lungs are clear bilaterally. No acute respiratory distress, No wheezing, No increased work of breathing. Lungs clear to auscultation.  Abdomen: Soft, Non tender, Non-distended, No pulsatile masses, No peritoneal signs.  Skin: Warm, Dry, No erythema, No rash.   Extremities: Intact distal pulses, No edema, No tenderness, No cyanosis.    Neurologic: Dysphonia. Alert , Normal motor function, Normal sensory function, No focal deficits noted.   Psychiatric: Affect normal, Judgment normal, Mood normal.     DIAGNOSTIC STUDIES / PROCEDURES     LABS  Labs Reviewed   CBC WITH DIFFERENTIAL - Abnormal; Notable for the following:        Result Value    WBC 11.1 (*)     RBC 4.52 (*)     Hemoglobin 13.3 (*)     Hematocrit 39.8 (*)     MCHC 33.4 (*)     Platelet Count 473 (*)     Neutrophils-Polys 73.40 (*)     Lymphocytes 15.60 (*)     Neutrophils (Absolute) 8.15 (*)     Monos (Absolute) 1.05 (*)     All other components within normal limits   COMP METABOLIC PANEL   ESTIMATED GFR     All labs were reviewed by me.    RADIOLOGY  CT-SOFT TISSUE " NECK WITH    (Results Pending)   CT-CHEST (THORAX) WITH    (Results Pending)     The radiologist's interpretation of all radiological studies have been reviewed by me.    COURSE & MEDICAL DECISION MAKING  Pertinent Labs & Imaging studies reviewed. (See chart for details)    11:35 PM - Patient seen and examined at bedside. Ordered CBC with differential, CMP, CT-soft tissue neck, C-chest to evaluate his symptoms.     12:09 AM - Paged Dr. Chapman (Hospitalist) to discuss patient's case and admit.    12:17 AM - Consulted with Dr. Chapman (Hospitalist) who agrees to admit.    Decision Making:  This is a 85 y.o. year old male who presents with dysphagia, and dysphonia.  The patient reportedly has a mass seen in an office visit earlier today.  In his posterior oropharynx.  The patient also has a history of significant aspiration.  Given this, he will remain nothing by mouth.  The patient had a CT scan of the neck and chest with contrast obtained for preoperative planning.  He will be admitted.  The hospitalist service, and nothing by mouth condition for continued evaluation and clearance prior to surgery tomorrow.    DISPOSITION:  Patient will be admitted to Dr. Chapman (Hospitalist) in guarded condition.      FINAL IMPRESSION  1.  Oral pharyngeal mass.  2.  Dysphonia.  3.  Dysphagia      Radha LAZO (Jossy), am scribing for, and in the presence of, Benitez Guerra M.D..    Electronically signed by: Radha Fatima (Jossy), 11/30/2017    Benitez LAZO M.D. personally performed the services described in this documentation, as scribed by Radha Fatima in my presence, and it is both accurate and complete.    The note accurately reflects work and decisions made by me.  Benitez Guerra  12/1/2017  1:25 AM

## 2017-12-01 NOTE — PROGRESS NOTES
[facility] Hospitalist Progress Note    Date of Service: 12/1/2017    Chief Complaint  85 y.o. male admitted 11/30/2017 with vocal cord mass with airway compromise seen on CAT scan. Patient was admitted for stridor, aspiration and community-acquired pneumonia.    Interval Problem Update  Easily arousable  On room air saturation with significant stridor and hoarse voice  No current respiratory distress on room air  Reports ongoing cough with inability to tolerate oral intake ×1 week.    I did discuss with patient regarding CAT scan findings and confirmed concern for respiratory failure. At this time patient would like to continue mechanical ventilation as needed, and surgical evaluation.  Patient states that if his respiratory status can be improved, patient would like to get it fixed including surgical evaluation.     Consultants/Specialty  ENT    Disposition  Transferred to the intensive care unit  Due to airway compromise secondary to vocal cord mass, may need emergent intubation if patient rapidly declined  ENT consulted, trach kit to bedside      spent a total of 40 minutes of critical care time during this clinical encounter of which > 50% was devoted to counseling and coordinating care including review of records, pertinent lab data and studies, as well as discussing diagnostic evaluation and work up, planned therapeutic interventions and future disposition of care. Where indicated, the assessment and plan reflect discussion of patient with consultants, other healthcare providers, family members, and additional research needed to obtain further information in formulating the plan of care of this patient. This time includes no procedures or overlap with other providers.       I discussed advance care planning with the patient's family for at least 26 minutes, including diagnosis, prognosis, plan of care, risks and benefits of any therapies that could be offered, as well as alternatives including palliation and  hospice, as appropriate. Patient to remain Full code status and surgical intervention as needed. BILL CODE 54970.      Review of Systems   Constitutional: Negative for chills, diaphoresis, fever and malaise/fatigue.   HENT: Positive for congestion and hearing loss.    Respiratory: Positive for cough, shortness of breath and stridor. Negative for sputum production and wheezing.    Cardiovascular: Negative for chest pain, palpitations and leg swelling.   Gastrointestinal: Negative for abdominal pain, nausea and vomiting.   Genitourinary: Negative for dysuria and flank pain.   Musculoskeletal: Negative for back pain, joint pain and myalgias.   Neurological: Positive for weakness. Negative for dizziness, sensory change, speech change, focal weakness and headaches.   Psychiatric/Behavioral: Negative for depression and memory loss. The patient is not nervous/anxious.       Physical Exam  Laboratory/Imaging   Hemodynamics  Temp (24hrs), Av.9 °C (98.5 °F), Min:36.5 °C (97.7 °F), Max:37.7 °C (99.8 °F)   Temperature: 36.7 °C (98.1 °F)  Pulse  Av  Min: 55  Max: 70 Heart Rate (Monitored): (!) 55  Blood Pressure : 145/82, NIBP: 159/70      Respiratory      Respiration: 19, Pulse Oximetry: 98 %     Work Of Breathing / Effort: Mild  RUL Breath Sounds: Clear, RML Breath Sounds: Coarse Crackles, RLL Breath Sounds: Coarse Crackles, BRYANT Breath Sounds: Clear, LLL Breath Sounds: Diminished    Fluids    Intake/Output Summary (Last 24 hours) at 17 1356  Last data filed at 17 1200   Gross per 24 hour   Intake              700 ml   Output              350 ml   Net              350 ml       Nutrition  Orders Placed This Encounter   Procedures   • Diet NPO     Standing Status:   Standing     Number of Occurrences:   1     Order Specific Question:   Restrict to:     Answer:   Sips with Medications [3]     Physical Exam   Constitutional: He is oriented to person, place, and time. He appears well-nourished. No distress.    HENT:   Head: Normocephalic and atraumatic.   Mouth/Throat: No oropharyngeal exudate.   Eyes: EOM are normal. Pupils are equal, round, and reactive to light. Right eye exhibits no discharge. Left eye exhibits no discharge.   Neck: No JVD present. No thyromegaly present.   Cardiovascular: Normal rate and intact distal pulses.    Pulmonary/Chest: Effort normal. No respiratory distress. He has no wheezes. He has no rales.   Stridor   Abdominal: Soft. Bowel sounds are normal. He exhibits no distension. There is no tenderness.   Musculoskeletal: He exhibits tenderness. He exhibits no edema.   Lymphadenopathy:     He has cervical adenopathy.   Neurological: He is alert and oriented to person, place, and time. No cranial nerve deficit. Coordination normal.   Skin: Skin is warm and dry. No rash noted. He is not diaphoretic. No erythema.   Psychiatric: He has a normal mood and affect. His behavior is normal. Judgment and thought content normal.   Nursing note and vitals reviewed.      Recent Labs      12/01/17   0030   WBC  11.1*   RBC  4.52*   HEMOGLOBIN  13.3*   HEMATOCRIT  39.8*   MCV  88.1   MCH  29.4   MCHC  33.4*   RDW  48.9   PLATELETCT  473*   MPV  9.1     Recent Labs      12/01/17   0030   SODIUM  138   POTASSIUM  3.9   CHLORIDE  107   CO2  24   GLUCOSE  82   BUN  21   CREATININE  0.71   CALCIUM  9.2                      Assessment/Plan     Vocal cord mass   Assessment & Plan    Seen on CT with airway compromise    CT neck:1.  5.3 x 3.7 x 3.9 cm laryngeal mass, likely carcinoma  2.  Enlarged LEFT level 2 neck lymph node  3.  Prominent RIGHT level 3 lymph node  4.  Severe narrowing of the upper airway    With noted stridor  Saturating 94% on room air, ongoing cough with minimal sputum production  Oxygen supplementation as needed    Patient was transferred to the intensive care unit for closer monitoring due to high risk for airway compromise and difficult intubation  Per pulmonary intensivist, tracheostomy kit is  ordered for bedside, in case of emergent respiratory compromise  Patient is high risk for respiratory failure requiring mechanical ventilation  I did discuss with the patient regarding advanced directives and possible need for surgical or mechanical ventilation if worsening respiratory distress  Patient is open to surgical evaluation and mechanical ventilation  Patient is to remain full code    I have consulted ENT, Dr. Coles, awaiting callback  Per staff, Dr. Coles to evaluate CT scan          Community acquired pneumonia of right lung   Assessment & Plan    Start on IV antibiotics  Follow-up blood cultures and respiratory cultures  RT protocol        Stridor   Assessment & Plan    Plan as above            Reviewed items::  Labs reviewed, Medications reviewed and Radiology images reviewed  DVT prophylaxis pharmacological::  Enoxaparin (Lovenox)  Ulcer Prophylaxis::  Not indicated

## 2017-12-01 NOTE — DIETARY
"Nutrition Services: Consult for Poor PO on Admit Screen & Low BMI    Ht: 5' 6\"  Wt: 44.9 kg (98#)- Stand-Up Scale  BMI: 15.98 (underweight classification)   Pertinent Labs: Reviewed  Pertinent Meds: Senokot  Fluids: NS @ 100 mL/hr  Skin: No skin breakdown noted per chart review  Gi:+ BM today  Diet: NPO day 1  PMH: No pertinent past medical history.    86 y/o male admitted d/t a large vocal cord mass that extends into and compresses his trachea with associated dysphagia and aspiration, per MD note. Patient transferred to ICU this morning for closer care. Attempted to see patient at bedside to assess weight and nutrition history, but pt was asleep a 1st attempt and being transported on 2nd attempt. Per chart review, pt pay have surgery to remove mass today, but POC is unsure, awaiting contact of pt's son for pt's POC. Per pts' ID issued in 2014, his weight was 140#, indicating a 42# weight loss within the past ~3 years 30% weight loss, which is significant to severe, depending on more accurate time frame of weight loss.     PLAN/RECOMMEND -   1) SLP to assess patient's swallowing abilities  2) Weekly weights to monitor fluid and nutrition status  3) Consult RD as needed for pt's diet/ supplement needs when medically appropriate    RD following    "

## 2017-12-01 NOTE — ASSESSMENT & PLAN NOTE
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 Poor prognosis. Patient chose care and comfort. Hospice on board. Patient will go home with home hospice.

## 2017-12-01 NOTE — CARE PLAN
Problem: Communication  Goal: The ability to communicate needs accurately and effectively will improve    Intervention: Educate patient and significant other/support system about the plan of care, procedures, treatments, medications and allow for questions  Encouraged pt to use call light for assistance, pt verbalized understanding.       Problem: Safety  Goal: Will remain free from injury    Intervention: Provide assistance with mobility  Bed in lowest position and locked, upper side rails up x2, treaded socks on, and bed alarm on. Belongings and call light in reach. Safety ongoing.       Problem: Infection  Goal: Will remain free from infection    Intervention: Implement standard precautions and perform hand washing before and after patient contact  Hand hygiene performed before and after pt care.       Problem: Knowledge Deficit  Goal: Knowledge of disease process/condition, treatment plan, diagnostic tests, and medications will improve    Intervention: Assess knowledge level of disease process/condition, treatment plan, diagnostic tests, and medications  Plan of care and medication administration discussed with pt.       Problem: Pain Management  Goal: Pain level will decrease to patient's comfort goal    Intervention: Follow pain managment plan developed in collaboration with patient and Interdisciplinary Team  Encouraged pt to notify staff of any pain before it become severe.

## 2017-12-01 NOTE — ED NOTES
Level of care changed per admitting MD, pt updated on POC, pt upset about wait. Pt educated on process.

## 2017-12-02 PROBLEM — E87.6 HYPOKALEMIA: Status: ACTIVE | Noted: 2017-01-01

## 2017-12-02 NOTE — CARE PLAN
Problem: Bronchopulmonary Hygiene:  Goal: Increase mobilization of retained secretions  Outcome: PROGRESSING SLOWER THAN EXPECTED  Aerosol 4L 28%

## 2017-12-02 NOTE — PROGRESS NOTES
Pt medicated with morphine per order for pain with fair results, pt has calmed down slightly, remains in restraints.  Pt opens eyes briefly when spoken to.  Dr. Coles in to round on pt, he removed original surgical dressing at trach site, updated on pt condition.  Dr. Coles spoke with pt's son and will meet with him tomorrow morning around 10:30 to discuss treatment options.

## 2017-12-02 NOTE — PROGRESS NOTES
"S: Very restless and disoriented.  Has had some oozing around the tracheostomy site.  Cardiac has been ok over the night.     O: Blood pressure 140/79, pulse (!) 56, temperature 36.8 °C (98.2 °F), resp. rate 18, height 1.676 m (5' 5.98\"), weight 45.8 kg (100 lb 15.5 oz), SpO2 96 %.  Recent Labs      12/01/17   0030  12/02/17   0545   WBC  11.1*  23.1*   RBC  4.52*  4.60*   HEMOGLOBIN  13.3*  13.3*   HEMATOCRIT  39.8*  39.6*   MCV  88.1  86.1   MCH  29.4  28.9   MCHC  33.4*  33.6*   RDW  48.9  46.8   PLATELETCT  473*  437   MPV  9.1  9.2     Recent Labs      12/01/17   0030  12/02/17   0545   SODIUM  138  136   POTASSIUM  3.9  3.4*   CHLORIDE  107  106   CO2  24  18*   GLUCOSE  82  152*   BUN  21  15   CREATININE  0.71  0.51   CALCIUM  9.2  7.9*     I/O last 3 completed shifts:  In: 2500 [I.V.:2500]  Out: 1000 [Urine:1000]  Trach site-fausto serous    A: POD 1 Tracheostomy, DL with biopsies, esophagoscopy    P: NG tube ok if coretrac not indicated.  Was able to get inot esophagus fairly easily last night. Have begun discussing situation with son and have plans to meet him at 10:30 tomorrow at .  Believe best option would be laryngectomy. +/- neck dissections with RT postop.  Believe RT/Chemo would be more morbid in the end and less effective.  No treatment also an option, other than alimentation and airway support.   "

## 2017-12-02 NOTE — PROGRESS NOTES
Monitor summary: NSR 14/08/38    Occasional PAC's.    Pt went into A fib RVR or SVT per OR RN at 2100 12/2/2017, self converted after adenosinex2 per report.      12 hour chart check performed at this time

## 2017-12-02 NOTE — OR SURGEON
Immediate Post OP Note    PreOp Diagnosis: T4 LARYNGEAL CANCER    PostOp Diagnosis: SAME-PENDING BIOPSIES    Procedure(s):  TRACHEOSTOMY - Wound Class: Clean Contaminated  ESOPHAGOSCOPY - Wound Class: Clean Contaminated  LARYNGOSCOPY-DIRECT WITH BIOPSY - Wound Class: Clean Contaminated    Surgeon(s):  Yassine Coles M.D.    Anesthesiologist/Type of Anesthesia:  Anesthesiologist: Li Cueto M.D./General    Surgical Staff:  Circulator: Joanne Valentine R.N.  Scrub Person: Liana Parker    Specimens:  SENT IN FORMALIN TO PATHOLOGY    Estimated Blood Loss: 20 CC    Findings: LARGE CIRCUMFERENTIAL CA SUPRAGLOTTIS EXTENDING TO GLOTTIS LEFT GREATER THAN RIGHT.  ESOPHAGUS NORMAL    Complications: NONE        12/1/2017 9:01 PM Yassine Coles

## 2017-12-02 NOTE — CONSULTS
DATE OF SERVICE:  12/01/2017    HISTORY OF PRESENT ILLNESS:  The patient is an 85-year-old male referred to me   by Dr. Jung who has been increasingly hoarse over the past month.  His   breathing has now become more difficult.  He was seen by Dr. Jung on the 29th   and noted to have what appeared to be laryngeal cancer.  He also has had a   recent barium swallow and has miladis aspiration.  He has a long history of   smoking at one-half pack per day.  Physical exam in my office revealed an   apparent squamous cell carcinoma of the larynx.  The patient does have what   appears to be an intubatable airway; however.  He was admitted for airway   observation and CT scanning and for tracheostomy tube placement tonight.    PREVIOUS MEDICAL HISTORY:  The patient denies a history of coronary artery   disease, hypertension, pulmonary issues, or any other medical problem at this   point and is taking no medicines.    MEDICATIONS:  None.    ALLERGIES:  NKDA.    HABITS:  Tobacco, one-half pack per day.  ETOH, none.  Caffeine, 4 caffeinated   products a day.    REVIEW OF SYSTEMS:  The patient has shortness of breath and cannot eat.  He   has pain in his throat, but no other issues.    PHYSICAL EXAMINATION:  GENERAL:  Well-developed, very thin, 85-year-old male, in mild-to-moderate   airway distress.  There is audible stridor, but it is not overtly loud.  Pulse   65, weight 45.4 kilograms, pulse oximetry 97% on room air.  HEENT:  Pinnas, external auditory canals, and tympanic membranes normal.    Nasal exam shows a deviated 2+ right-sided septal deviation.  The inferior   turbinates are normal in size.  The oral cavity is within normal limits with   healthy appearing mucosa.  The tonsils are 1+ enlarged.  NECK:  Palpation of the neck shows a very thin neck with shotty adenopathy.    Nasopharyngoscopy reveals a tilt of the larynx with the epiglottis canted to   the left.  There is an apparent circumferential tumor involving the  posterior   glottis as well.  The glottis itself is patent, but small, reduced in size   from 50% to 60%.  I cannot see through it to know whether or not there is   subglottic extension.    IMAGING STUDIES:  Review of the CT scan demonstrated a 5.3x3.7x3.9 mass   centered about the larynx, extending into the anterior right and left soft   tissues.  There is erosion of the thyroid cartilage bilaterally.  The airway   is significantly narrowed.  There is an enlarged level 2 lymph node and a   prominent level 3 node.  There does not appear to be any subglottic extension.    CT of the chest shows likely early pneumonia in the right upper lobe, a 4x4   mm right upper lobe pulmonary nodule, evidence of a remote granulomatous   disease involving the mediastinum and left hilar node as well as nodes in the   spleen.  There is a hypodense hepatic lesion, at least 12 mm in dimension as   well.    IMPRESSION:  1.  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  2.  Emphysema.  3.  Long history of smoking.    PLAN:  The patient will be taken to the operating room on the December 1st for   tracheostomy tube placement and biopsies.  Further treatment will depend on   what the patient wants to do and the nature of the lesion, i.e., whether or   not it involves the hypopharynx and would be considered resectable.    Thank you very much for this consultation.       ____________________________________     MD NICOLE VASQUEZ / EMBER    DD:  12/01/2017 17:20:08  DT:  12/01/2017 18:22:51    D#:  [id number]  Job#:  [id number]

## 2017-12-02 NOTE — PROGRESS NOTES
NG tube placed per order after KUB completed at bedside and YASHIRA Denise reviewed results.  Pt tolerated procedure with some discomfort, medicated for pain at time of procedure for his comfort.  Return is bloody brown, small amount.  Pt remains lethargic, does not follow commands.

## 2017-12-02 NOTE — OP REPORT
DATE OF SERVICE:  12/01/2017    PREOPERATIVE DIAGNOSIS:  Laryngeal cancer.    POSTOPERATIVE DIAGNOSES:  T4 laryngeal cancer, pending pathology.    PROCEDURE:  1.  Awake tracheostomy.  2.  Direct laryngoscopy with biopsies.  3.  Esophagoscopy.    ANESTHESIOLOGIST:  Yassine Coles MD    FIRST ASSISTANT:  Naga Santizo MD    ANESTHESIA:  Li Cueto MD    INDICATIONS:  The patient is an 85-year-old, who has had increasing difficulty   breathing and speaking over the past month.  He also has had difficulty   eating with pain and aspiration.  A barium swallow recently was aborted for   miladis and gross aspiration.  He was seen by Dr. Jung, who on endoscopy noted   an apparent laryngeal tumor.  Dr. Jung has headed out of town, so he turned   care over to me.  CT scanning demonstrated a 5-cm laryngeal process with a   very narrow airway.  The patient now presents for tracheostomy tube placement,   direct laryngoscopy with biopsies, and esophagoscopy.    PROCEDURE:  The patient was taken to the operating room and placed in the   supine position.  Sedation was achieved per anesthesia, but the patient was   kept spontaneously breathing.  Lidocaine 1% with 1:100,000 epinephrine was   used to infiltrate superficial to the trachea just inferior to the cricoid   cartilage.  Infiltration occurred to the trachea.  The patient was then   prepped and draped in the usual sterile fashion.  An incision was made over   the trachea transversely and dissection proceeded through the subcutaneous   tissue.  The patient had very little subcutaneous fat.  The strap muscles were   noted and each grasped with an Allis clamp.  Dissection proceeded in between   them and the deeper straps noted.  These were also grasped.  Dissection   proceeded in the thyroid isthmus, came into immediate view.  It was only about   1 cm in craniocaudad dimension, but fairly thick.  A plane was developed deep   to it superiorly and inferiorly and hemostat  passed in between it and the   trachea.  The thyroid isthmus was divided.  A trach hook was then placed and   an incision made at the second tracheal interspace.  Small inferiorly based   flap was created and a 3-0 silk placed as a stay stitch through the inferiorly   based tracheal cartilaginous flap.  A trach  was then introduced and   #6 cuffed Shiley tracheostomy tube placed.  There was fair amount of secretion   in the trachea, which was removed.  The trachea itself appeared to be in   excellent condition.  The trach tube was then sutured to the patient's skin   with 2-0 silk.  Trach sponge was placed and then a Velcro trach tie placed.    The table was then rotated 90 degrees.  At this point, the patient went into   rapid atrial rhythm, thought to be atrial fib/flutter.  Adenosine was given by   Dr. Cueto with restoration of sinus rhythm, although this had to be   repeated.  The patient's upper gums were protected with a moist sponge and   esophagoscopy performed.  The tumor was not involving the esophageal inlet or   any of the hypopharyngeal mucosa.  Direct laryngoscopy was then performed.    The piriform sinuses were normal.  The epiglottis was normal down to about the   inferior third of it where tumor seemed to involved in as well as the left   supraglottis and to a lesser extent the right supraglottis.  The glottic inlet   was very narrow.  Biopsies were taken from the left side.  A gritty   tumor-like substance was obtained.  The specimens were sent to pathology in   formalin for permanent section.  Hemostasis was obtained with cottonoids   moistened in 1% lidocaine with 1:200,000 epinephrine.  These were then removed   and hemostasis assured.  The arytenoids were quite edematous.  The piriform   sinuses were checked and were normal on both sides.  The vallecula was normal   with no apparent tumor in the base of tongue area.  The patient was then   awakened and taken back to the CCU in stable  condition.  He tolerated the   procedure well, and there were no complications.       ____________________________________     MD NICOLE VASQUEZ / EMBER    DD:  12/01/2017 21:14:45  DT:  12/02/2017 01:16:31    D#:  [id number]  Job#:  [id number]

## 2017-12-02 NOTE — CONSULTS
Cardiology Consultation    Reason for Consultation - irregular heart beat  Doctor asking for Consultation - Dr Coles/ENT      HPI:      85-year-old gentleman who is lives in [place] for many years and recently met as an outpatient with Dr. Jung of ENT for increasing hoarseness. He himself is unable to talk at this moment because he is a brand-new trach in place. He nods only to tell me that he has some pain at the site. His history is obtained from nursing, Dr. Coles and the chart.     He and his son had admittedly not followed closely with medical doctors. He has stated that he has smoked heavily for many years but recently quit. He had been to the emergency room for hoarseness and bloody sputum in 2009, but never established with medical care. A CAT scan of his chest at that point was normal.     He told his outpatient ENT yesterday that he feels like he has been choking. He was cutting down and trying to quit his cigarettes, last of which was 29 November.     As an outpatient yesterday, chest x-ray was normal. His barium swallow he aspirated quickly .  CT of the chest and neck today were highly suspicious for large laryngeal mass and to large lymph nodes in his neck all of which was compressing his airway    In any event, he was admitted to the emergency room late last night with plans for his surgery today.  This was just completed electively at about 9:00 this evening. During the course of the surgery without concomitant trauma, anesthesia issues or hypoxia, the anesthesiologist noted that he had some transient narrow complex tachycardias perhaps fibrillation. These lasted for seconds but no more than minutes. His hemodynamics are stable throughout. It was an uneventful low risk surgery according to the surgeon.    The patient recovered uneventfully back to his room in the cardiac intensive care unit.       Past Medical History:  has no past medical history on file outside of the above workup for his  "laryngeal mass.  Past Surgical History:  has a past surgical history that includes cataract extraction.  Past Social History:  reports that he has been smoking Cigarettes.  He has a 90.00 pack-year smoking history. He has never used smokeless tobacco. He reports that he does not drink alcohol or use drugs.  Past Family History: History reviewed. No pertinent family history.  Allergies: Patient has no known allergies.    Current Medications:  Prior to Admission medications NONE          Review of Systems:  Review of Systems   Unable to perform ROS: Patient nonverbal     Blood pressure 140/79, pulse (!) 56, temperature 36.1 °C (96.9 °F), resp. rate 13, height 1.676 m (5' 5.98\"), weight 44.4 kg (97 lb 14.2 oz), SpO2 99 %.    Physical Examination:  Physical Exam   Constitutional: He is oriented to person, place, and time. No distress.   Cachectic and barrel chested   HENT:   Head: Normocephalic and atraumatic.   Eyes: EOM are normal. Pupils are equal, round, and reactive to light. No scleral icterus.   Neck: No JVD present. No tracheal deviation present. No thyromegaly present.   Cardiovascular: Normal rate, regular rhythm and intact distal pulses.    No murmur heard.  Pulmonary/Chest: He has no wheezes. He has no rales. He exhibits no tenderness.   Fresh trach, patient uncomfortable and trace mucus with blood streaking, wound site clear and intact - very diminished breath sounds bilaterally no crackles   Abdominal: Bowel sounds are normal. He exhibits no distension.   Musculoskeletal: Normal range of motion. He exhibits no edema or tenderness.   Lymphadenopathy:     He has cervical adenopathy.   Neurological: He is alert and oriented to person, place, and time. He exhibits normal muscle tone. Coordination normal.   Skin: Skin is warm and dry. No rash noted. He is not diaphoretic.   Psychiatric: He has a normal mood and affect. His behavior is normal.     Data:  CT today as well as x-ray and barium swallow yesterday are " described above  EKG done today at approximately 5:00 and reviewed by me shows sinus rhythm with no ischemic changes and normal intervals  Tracings from the operating room have not been recorded    White count today 11.1 with a slight left shift hemoglobin 13.3 with a normal MCV platelets 473    Potassium 3.9 sodium 138 creatinine 0.7 with normal LFTs outside of an albumin of 3.4    Impression:    Suspected arrhythmia, transient  No high risk features of fibrillation or ischemia  Suspected laryngeal carcinoma, status post tracheostomy    Plan:    Observation & intensive care per the surgical team  If he has arrhythmias, ventricular or supraventricular - ensure that he has excellent oxygenation, obtain echo as has been ordered and judicial use of amiodarone would be prudent    Don't hesitate to call us with concerns or questions  Plan discussed with bedside RN and respiratory  We will follow along as needed, thank you kindly

## 2017-12-02 NOTE — PROGRESS NOTES
Pt very restless and agitated this morning, trying to get out of bed and pulling at lines and trach tubing.  Pt does not wake up or follow commands at this time.  Trach site very bloody and moist with frequent suctioning required to clear bloody secretions.  Pt in restraints for safety of self and invasive lines.  Pt repositioned frequently, continues to squirm in bed and has legs over edge of bed.

## 2017-12-02 NOTE — THERAPY
Speech Therapy Contact Note  Orders received for clinical swallow evaluation. RN reporting pt currently confused, agitated, and not appropriate for evaluation on this date. Pt has trach and per RN, pt and pt's son to discuss POC with MD tomorrow (laryngectomy VS chemoradiation). SLP to clarify order (CSE vs speaking valve) with Dr. Coles tomorrow AM.

## 2017-12-02 NOTE — PROGRESS NOTES
[facility] Hospitalist Progress Note    Date of Service: 2017    Chief Complaint  85 y.o. male admitted 2017 with vocal cord mass with airway compromise seen on CAT scan. Patient was admitted for stridor, aspiration and community-acquired pneumonia.  S/p trach with ENT complicated by severe delirium postop.       Interval Problem Update  Trach last night  Very agitated today  In nsr  Npo no bm  Condom cath  Mobilizing self  On 4L   Dvt proph on hold  Cx with mixed bacteria      haldol  K    Consultants/Specialty  ENT    Disposition  icu      CT neck:1.  5.3 x 3.7 x 3.9 cm laryngeal mass, likely carcinoma  2.  Enlarged LEFT level 2 neck lymph node  3.  Prominent RIGHT level 3 lymph node  4.  Severe narrowing of the upper airway        Review of Systems   Unable to perform ROS: Medical condition      Physical Exam  Laboratory/Imaging   Hemodynamics  Temp (24hrs), Av.8 °C (98.2 °F), Min:36.1 °C (96.9 °F), Max:37.7 °C (99.8 °F)   Temperature: 36.8 °C (98.2 °F)  Pulse  Av.3  Min: 52  Max: 77 Heart Rate (Monitored): 79  Blood Pressure : 140/79, NIBP: 140/59      Respiratory    #Aerosol Therapy / Airway Management: Trache Collar, Aerosol Humidity Temp (celsius): 31 Respiration: 17, Pulse Oximetry: 97 %, O2 Daily Delivery Respiratory : Trache Collar     Work Of Breathing / Effort: Mild  RUL Breath Sounds: Clear, RML Breath Sounds: Coarse Crackles, RLL Breath Sounds: Coarse Crackles, BRYANT Breath Sounds: Clear, LLL Breath Sounds: Diminished    Fluids    Intake/Output Summary (Last 24 hours) at 17 0751  Last data filed at 17 2106   Gross per 24 hour   Intake             2500 ml   Output             1000 ml   Net             1500 ml       Nutrition  Orders Placed This Encounter   Procedures   • Diet NPO     Standing Status:   Standing     Number of Occurrences:   1     Order Specific Question:   Restrict to:     Answer:   Sips with Medications [3]     Physical Exam   Constitutional: He is oriented to  person, place, and time. He appears well-developed and well-nourished. No distress.   HENT:   Right Ear: External ear normal.   Left Ear: External ear normal.   Nose: Nose normal.   trach   Eyes: Pupils are equal, round, and reactive to light. Right eye exhibits no discharge. Left eye exhibits no discharge. No scleral icterus.   Neck: No thyromegaly present.   Cardiovascular: Normal rate and normal heart sounds.    No murmur heard.  Pulmonary/Chest: Effort normal and breath sounds normal. No respiratory distress. He has no rales.   Stridor   Abdominal: Soft. Bowel sounds are normal. He exhibits no distension. There is no tenderness.   Musculoskeletal: He exhibits tenderness. He exhibits no edema.   Lymphadenopathy:     He has cervical adenopathy.   Neurological: He is oriented to person, place, and time.   Lethargic and confused   Skin: Skin is warm and dry. No rash noted. He is not diaphoretic. No erythema.   Psychiatric: His affect is labile. He is agitated. Cognition and memory are impaired. He expresses impulsivity and inappropriate judgment.   Nursing note and vitals reviewed.      Recent Labs      12/01/17   0030  12/02/17   0545   WBC  11.1*  23.1*   RBC  4.52*  4.60*   HEMOGLOBIN  13.3*  13.3*   HEMATOCRIT  39.8*  39.6*   MCV  88.1  86.1   MCH  29.4  28.9   MCHC  33.4*  33.6*   RDW  48.9  46.8   PLATELETCT  473*  437   MPV  9.1  9.2     Recent Labs      12/01/17   0030  12/02/17   0545   SODIUM  138  136   POTASSIUM  3.9  3.4*   CHLORIDE  107  106   CO2  24  18*   GLUCOSE  82  152*   BUN  21  15   CREATININE  0.71  0.51   CALCIUM  9.2  7.9*                      Assessment/Plan     Vocal cord mass   Assessment & Plan    Causing severe narrowing of upper airway.   S/p trach            Community acquired pneumonia of right lung   Assessment & Plan     IV antibiotics  Follow-up blood cultures and respiratory cultures  RT protocol        Hypokalemia   Assessment & Plan    Replace and trend        Stridor    Assessment & Plan    Plan as above            Reviewed items::  Labs reviewed, Medications reviewed and Radiology images reviewed  DVT prophylaxis pharmacological::  Enoxaparin (Lovenox)  Ulcer Prophylaxis::  Not indicated

## 2017-12-02 NOTE — PROGRESS NOTES
Cardiology Progress Note               Author: Moshe Robert Date & Time created: 2017  10:52 AM     Interval History:  85-year-old who underwent tracheostomy and direct laryngeal mask of the with biopsies, 2017 and developed a tachycardia intraoperatively not documented by rhythm strips.  Admitted to CCU.  : /59. Pulse 66. No cardiac arrhythmias per RN.    Review of Systems   Unable to perform ROS: Mental status change       Physical Exam   Constitutional: He is oriented to person, place, and time. No distress.   Cachectic. Lethargic.   HENT:   Head: Normocephalic.   Eyes: EOM are normal. No scleral icterus.   Neck:   Tracheostomy in place.   Cardiovascular: Normal rate, regular rhythm, S1 normal, S2 normal and normal heart sounds.  Exam reveals no gallop and no friction rub.    No murmur heard.  Pulses:       Radial pulses are 2+ on the right side.   Pulmonary/Chest: Effort normal and breath sounds normal. He has no wheezes. He has no rhonchi. He has no rales.   Musculoskeletal: He exhibits no edema.   Neurological: He is alert and oriented to person, place, and time.   Skin: Skin is warm and dry.   Psychiatric: He has a normal mood and affect. His behavior is normal.       Hemodynamics:  Temp (24hrs), Av.6 °C (97.9 °F), Min:36.1 °C (96.9 °F), Max:37.1 °C (98.8 °F)  Temperature: 36.8 °C (98.2 °F)  Pulse  Av.1  Min: 52  Max: 77Heart Rate (Monitored): 80  Blood Pressure : 140/79, NIBP: 140/59     Respiratory:   #Aerosol Therapy / Airway Management: Trache Collar, Aerosol Humidity Temp (celsius): 34Respiration: 18, Pulse Oximetry: 96 %, O2 Daily Delivery Respiratory : Trache Collar     Work Of Breathing / Effort: Mild  RUL Breath Sounds: Clear, RML Breath Sounds: Clear, RLL Breath Sounds: Diminished, BRYANT Breath Sounds: Clear, LLL Breath Sounds: Diminished  Fluids:     Weight: 45.8 kg (100 lb 15.5 oz)  GI/Nutrition:  Orders Placed This Encounter   Procedures   • Diet NPO     Standing  Status:   Standing     Number of Occurrences:   1     Order Specific Question:   Restrict to:     Answer:   Sips with Medications [3]     Lab Results:  Recent Labs      12/01/17   0030  12/02/17   0545   WBC  11.1*  23.1*   RBC  4.52*  4.60*   HEMOGLOBIN  13.3*  13.3*   HEMATOCRIT  39.8*  39.6*   MCV  88.1  86.1   MCH  29.4  28.9   MCHC  33.4*  33.6*   RDW  48.9  46.8   PLATELETCT  473*  437   MPV  9.1  9.2     Recent Labs      12/01/17   0030  12/02/17   0545   SODIUM  138  136   POTASSIUM  3.9  3.4*   CHLORIDE  107  106   CO2  24  18*   GLUCOSE  82  152*   BUN  21  15   CREATININE  0.71  0.51   CALCIUM  9.2  7.9*                         Medical Decision Making, by Problem:  Active Hospital Problems    Diagnosis   • 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 No arrhythmias.    We'll continue to monitor but could be transferred off the CCU.      Reviewed items::  EKG reviewed, Radiology images reviewed, Labs reviewed and Medications reviewed

## 2017-12-03 NOTE — PROGRESS NOTES
Cardiology Progress Note               Author: Moshe Robert Date & Time created: 12/3/2017  9:40 AM     Interval History:  85-year-old who underwent tracheostomy and direct laryngeal mask of the with biopsies, 2017 and developed a tachycardia intraoperatively not documented by rhythm strips.  Admitted to CCU.  : /59. Pulse 66. No cardiac arrhythmias per RN.  12/3: /81. Pulse 96. Developed atrial fibrillation around 2 AM started on IV amiodarone and continues to be in atrial fibrillation but at a lower heart rate.    Review of Systems   Unable to perform ROS: Mental status change       Physical Exam   Constitutional: No distress.   Cachectic. Lethargic.   HENT:   Head: Normocephalic.   Neck:   Tracheostomy in place.   Cardiovascular: Normal rate, regular rhythm, S1 normal, S2 normal and normal heart sounds.  Exam reveals no gallop and no friction rub.    No murmur heard.  Pulses:       Radial pulses are 2+ on the right side.   Pulmonary/Chest: Effort normal and breath sounds normal. He has no wheezes. He has no rhonchi. He has no rales.   Musculoskeletal: He exhibits no edema.   Skin: Skin is warm and dry.       Hemodynamics:  Temp (24hrs), Av.9 °C (98.5 °F), Min:36.7 °C (98 °F), Max:37.1 °C (98.8 °F)  Temperature: 37.1 °C (98.7 °F)  Pulse  Av.9  Min: 52  Max: 121Heart Rate (Monitored): 95  NIBP: (!) 166/83     Respiratory:   #Aerosol Therapy / Airway Management: Trache Collar, Aerosol Humidity Temp (celsius): 35Respiration: (!) 28, Pulse Oximetry: 97 %, O2 Daily Delivery Respiratory : Trache Collar     Work Of Breathing / Effort: Mild  RUL Breath Sounds: Clear, RML Breath Sounds: Clear, RLL Breath Sounds: Diminished, BRYANT Breath Sounds: Clear, LLL Breath Sounds: Diminished  Fluids:        GI/Nutrition:  Orders Placed This Encounter   Procedures   • Diet NPO     Standing Status:   Standing     Number of Occurrences:   1     Order Specific Question:   Restrict to:     Answer:   Sips  with Medications [3]     Lab Results:  Recent Labs      12/01/17   0030  12/02/17   0545   WBC  11.1*  23.1*   RBC  4.52*  4.60*   HEMOGLOBIN  13.3*  13.3*   HEMATOCRIT  39.8*  39.6*   MCV  88.1  86.1   MCH  29.4  28.9   MCHC  33.4*  33.6*   RDW  48.9  46.8   PLATELETCT  473*  437   MPV  9.1  9.2     Recent Labs      12/01/17   0030  12/02/17   0545   SODIUM  138  136   POTASSIUM  3.9  3.4*   CHLORIDE  107  106   CO2  24  18*   GLUCOSE  82  152*   BUN  21  15   CREATININE  0.71  0.51   CALCIUM  9.2  7.9*                         Medical Decision Making, by Problem:  Active Hospital Problems    Diagnosis   • 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    IV amiodarone        Reviewed items::  EKG reviewed, Radiology images reviewed, Labs reviewed and Medications reviewed

## 2017-12-03 NOTE — CARE PLAN
Problem: Bronchopulmonary Hygiene:  Goal: Increase mobilization of retained secretions  Outcome: PROGRESSING AS EXPECTED  Pt remains on aerosol via trache collar 4L 28%, tolerating well.

## 2017-12-03 NOTE — CARE PLAN
"Problem: Communication  Goal: The ability to communicate needs accurately and effectively will improve    Intervention: Reorient patient to environment as needed  Pt is hard of hearing and confused, unable to communicate needs clearly, whispers \"bathroom\" intermittently.  Pt reoriented to place and situation, continues to be unable to make needs known.      Problem: Safety  Goal: Will remain free from injury    Intervention: Provide assistance with mobility  Pt very restless during day, trying to sit up in bed, throws legs over the edge of the bed and trying to get up.  Reinforced education regarding staying in bed and safety measures, pt requires restraints for invasive line and patient safety.      Problem: Bowel/Gastric:  Goal: Will not experience complications related to bowel motility    Intervention: Assess baseline bowel pattern  Pt with distended abdomen, portable x-ray completed and orders received to place NG tube to low wall suction to help with decompression.  Pt did not have BM today.      Problem: Pain Management  Goal: Pain level will decrease to patient's comfort goal    Intervention: Follow pain managment plan developed in collaboration with patient and Interdisciplinary Team  Pt medicated for generalized discomfort during day with morphine as ordered.  Pt calm intermittently, has periods of agitation, he is unable to describe his needs and medicated for pain based on non-verbal score.      Problem: Respiratory:  Goal: Respiratory status will improve    Intervention: Assess and monitor pulmonary status  Pt tolerating trach mask at 4 liters with 02 saturations > 90%.  Pt's trach continues to have bloody secretions, dressing changed x 3 today along with gown due to bleeding.  Pt suctioned frequently this morning, this has slowed down during evening, still has lightly bloody secretions when trach suctioned.        "

## 2017-12-03 NOTE — FLOWSHEET NOTE
Respiratory Therapy Update    Interdisciplinary Plan of Care-Goals (Indications)  Bronchopulmonary Hygiene Indications: Difficulty with Secretion Clearance (12/02/17 2325)  Interdisciplinary Plan of Care-Outcomes   Bronchopulmonary Hygiene Outcome: Optimal Hydration with Moderate or Less Sputum Production (12/02/17 2325)               Cough: Moist;Productive (12/02/17 2325)  Sputum Amount: Moderate (12/02/17 2325)  Sputum Color: Bloody;Tan;Yellow (12/02/17 2325)  Sputum Consistency: Thick (12/02/17 2325)               O2 (FiO2): 28 (12/02/17 2325)  O2 (LPM): 4 (12/02/17 2325)  O2 Daily Delivery Respiratory : Trache Collar (12/02/17 2325)    Breath Sounds  Pre/Post Intervention: Pre Intervention Assessment (12/02/17 1811)  RUL Breath Sounds: Clear After Suction;Fine Crackles (12/02/17 2325)  RML Breath Sounds: Clear After Suction;Fine Crackles (12/02/17 2325)  RLL Breath Sounds: Diminished (12/02/17 2325)  BRYANT Breath Sounds: Clear After Suction;Fine Crackles (12/02/17 2325)  LLL Breath Sounds: Diminished (12/02/17 2325)        Events/Summary/Plan: Pt. and aerosol checked.  Pt. resting w/o distress. (12/02/17 2325)

## 2017-12-03 NOTE — THERAPY
Speech Therapy Contact Note  Spoke with RN regarding current status and POC. Pt continues to be altered s/p tracheostomy. RN reported pt with ileus 2/2 to procedure and to HOLD swallow evaluation at this time. Family currently undecided regarding POC. SLP to check status tomorrow. Patient with NO SOURCE OF NUTRITION (2 days).

## 2017-12-03 NOTE — PROGRESS NOTES
[facility] Hospitalist Progress Note    Date of Service: 12/3/2017    Chief Complaint  85 y.o. male admitted 2017 with vocal cord mass with airway compromise seen on CAT scan. Patient was admitted for stridor, aspiration and community-acquired pneumonia.  S/p trach with ENT complicated by severe delirium postop.       Interval Problem Update  Confused and agitated since surgery  Requiring haldol  Afib with rvr this am now on amio gtt  Bp up some  Ngt to low suction  Last bm on 1st  On 4L 28%  Small bloody secretions  Dvt proph on hold for secretions  day3 abx for pnwumonia  No growth on cx to date      htn  Fib with RVR  Labs??  Ileus      Consultants/Specialty  ENT    Disposition  icu  Long discussion with family today regarding plan of care and prognosis  Dnr discussed in detail and son has agrees to DNR  They will further consider treating vs comfort care.   Time spent today discussing DNR is 33min  Total time spent today not including DNR discussion is 66min with greater than 50% of time spent discussing plan of care with family, RN         CT neck:1.  5.3 x 3.7 x 3.9 cm laryngeal mass, likely carcinoma  2.  Enlarged LEFT level 2 neck lymph node  3.  Prominent RIGHT level 3 lymph node  4.  Severe narrowing of the upper airway        Review of Systems   Unable to perform ROS: Medical condition      Physical Exam  Laboratory/Imaging   Hemodynamics  Temp (24hrs), Av.9 °C (98.5 °F), Min:36.7 °C (98 °F), Max:37.1 °C (98.8 °F)   Temperature: 37.1 °C (98.8 °F)  Pulse  Av.2  Min: 52  Max: 121 Heart Rate (Monitored): (!) 106  NIBP: (!) 166/83      Respiratory    #Aerosol Therapy / Airway Management: Trache Collar, Aerosol Humidity Temp (celsius): 35 Respiration: (!) 22, Pulse Oximetry: 97 %, O2 Daily Delivery Respiratory : Trache Collar     Work Of Breathing / Effort: Mild  RUL Breath Sounds: Clear, RML Breath Sounds: Clear, RLL Breath Sounds: Diminished, BRYANT Breath Sounds: Clear, LLL Breath Sounds:  Diminished    Fluids    Intake/Output Summary (Last 24 hours) at 12/03/17 0757  Last data filed at 12/03/17 0600   Gross per 24 hour   Intake             3656 ml   Output              280 ml   Net             3376 ml       Nutrition  Orders Placed This Encounter   Procedures   • Diet NPO     Standing Status:   Standing     Number of Occurrences:   1     Order Specific Question:   Restrict to:     Answer:   Sips with Medications [3]     Physical Exam   Constitutional: He appears well-developed and well-nourished. No distress.   HENT:   Nose: Nose normal.   Mouth/Throat: Oropharynx is clear and moist.   trach   Eyes: Right eye exhibits no discharge. Left eye exhibits no discharge. No scleral icterus.   Neck: No tracheal deviation present. No thyromegaly present.   Cardiovascular: Regular rhythm and normal heart sounds.  Exam reveals no friction rub.    No murmur heard.  Pulmonary/Chest: Effort normal. No respiratory distress. He has no wheezes. He has rales.   Stridor   Abdominal: Soft. Bowel sounds are normal. He exhibits no distension. There is no tenderness.   Musculoskeletal: He exhibits tenderness. He exhibits no edema.   Lymphadenopathy:     He has cervical adenopathy.   Neurological:   Lethargic and confused- worse today   Skin: Skin is warm and dry. No rash noted. He is not diaphoretic. No erythema.   Psychiatric: His affect is blunt and labile. He is agitated and slowed. Cognition and memory are impaired. He expresses impulsivity and inappropriate judgment. He is noncommunicative. He is inattentive.   Nursing note and vitals reviewed.      Recent Labs      12/01/17   0030  12/02/17   0545   WBC  11.1*  23.1*   RBC  4.52*  4.60*   HEMOGLOBIN  13.3*  13.3*   HEMATOCRIT  39.8*  39.6*   MCV  88.1  86.1   MCH  29.4  28.9   MCHC  33.4*  33.6*   RDW  48.9  46.8   PLATELETCT  473*  437   MPV  9.1  9.2     Recent Labs      12/01/17   0030  12/02/17   0545   SODIUM  138  136   POTASSIUM  3.9  3.4*   CHLORIDE  107  106    CO2  24  18*   GLUCOSE  82  152*   BUN  21  15   CREATININE  0.71  0.51   CALCIUM  9.2  7.9*                      Assessment/Plan     Vocal cord mass   Assessment & Plan    Causing severe narrowing of upper airway.   S/p trach  Family thinking about treatment options.   Ent following            Community acquired pneumonia of right lung   Assessment & Plan     IV antibiotics  Follow-up blood cultures and respiratory cultures  RT protocol        Hypokalemia   Assessment & Plan    Replace and trend        Stridor   Assessment & Plan    Plan as above            Reviewed items::  Labs reviewed, Medications reviewed, Radiology images reviewed and EKG reviewed  Romano catheter::  No Romano  DVT prophylaxis pharmacological::  Enoxaparin (Lovenox)  Ulcer Prophylaxis::  Not indicated

## 2017-12-03 NOTE — PROGRESS NOTES
12 hour chart check performed at this time.    Monitor summary: A Fib      -/10/-    Pt was NSR on the monitor until approx 0245, stat EKG noted A-fib and pt started on amiodarone protocol per Dr. Walls with cardiology

## 2017-12-03 NOTE — PROGRESS NOTES
"S: Worse today.  Has pneumonia-it was present on the pre-op CT.  Had a-fib last night as well and was started on amiodarone.     O: Blood pressure 140/79, pulse 89, temperature 37.1 °C (98.7 °F), resp. rate (!) 21, height 1.676 m (5' 5.98\"), weight 45.8 kg (100 lb 15.5 oz), SpO2 92 %.  Recent Labs      12/01/17   0030  12/02/17   0545   WBC  11.1*  23.1*   RBC  4.52*  4.60*   HEMOGLOBIN  13.3*  13.3*   HEMATOCRIT  39.8*  39.6*   MCV  88.1  86.1   MCH  29.4  28.9   MCHC  33.4*  33.6*   RDW  48.9  46.8   PLATELETCT  473*  437   MPV  9.1  9.2     Recent Labs      12/01/17   0030  12/02/17   0545   SODIUM  138  136   POTASSIUM  3.9  3.4*   CHLORIDE  107  106   CO2  24  18*   GLUCOSE  82  152*   BUN  21  15   CREATININE  0.71  0.51   CALCIUM  9.2  7.9*     I/O last 3 completed shifts:  In: 4356 [I.V.:4266]  Out: 730 [Urine:530; Drains:200]  Trach site acceptable.    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  The best option medically I feel would be surgery to include laryngectomy with neck dissections, but I do not know if he would get through it given age and current status.  Would not do surgery until more stable from a cardiac and pulmonary standpoint.  Radiation alone could be considered but with little quality of life ie no eating and very little talking and with high morbidity, possibly the risk of death, and little prolongation of life.  No treatment also discussed.    "

## 2017-12-04 PROBLEM — E87.20 METABOLIC ACIDOSIS: Status: ACTIVE | Noted: 2017-01-01

## 2017-12-04 PROBLEM — I48.91 ATRIAL FIBRILLATION WITH RVR (HCC): Status: ACTIVE | Noted: 2017-01-01

## 2017-12-04 PROBLEM — J38.7 LARYNGEAL MASS: Status: ACTIVE | Noted: 2017-01-01

## 2017-12-04 PROBLEM — G93.40 ACUTE ENCEPHALOPATHY: Status: ACTIVE | Noted: 2017-01-01

## 2017-12-04 NOTE — PROGRESS NOTES
12 hour chart check performed at this time.    Monitor summary:    1900 A-fib > 2218  Accelerated Junctional > 0200 NSR    12/08/40

## 2017-12-04 NOTE — PROGRESS NOTES
Paged cardiology to inform MD that patient back in A-fib, rate 90s-100s. Per Cardiology, keep on amio drip at 0.5mg. Per Cardiology, inform them if patient's HR is above 120bpm.

## 2017-12-04 NOTE — CONSULTS
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  Past medical history of CAD, HTN, and seen for suspected laryngeal cancer on 11/29/17.  Patient is current resting comfortably, denies pain but does have SOB.  Palliative Care referral for advance care planning.      Dyspnea: Yes- 99% 4L/trach blow by  Last BM: 12/01/17- Per RN assessment  Pain: No-    Depression: Mood appropriate for situation-    Dementia: Unable to Determine;       Spiritual:  Is Lutheran or spirituality important for coping with this illness? Yes-    Has a  or spiritual provider visit been requested? Yes    Palliative Performance Scale: 20%    Advance Directive: None on File    DPOA: None on File- MARIANNE- Justin Morales Jr. \"Hi\"- son ( [phone]) and Justus Morales, son ([phone])  POLST: None on File    Code Status: DNR-      Outcome:  I met with patient who was resting but awoke easily.  Patient mildly disoriented once awake, able to redirect.  Patient denies pain but is SOB.  I introduced myself and role of Palliative Care support/education for patient and family- He nodded in agreement.   was offered and accepted.  Patient denied any further needs at this time.  PC to Justin Darnell \"Hi\"- Introduced myself and role of Palliative Care support and education.  Hi was able to discuss the patient's dx and up to date treatments.  Hi sound overwhelmed and distressed, tearful.  He stated that he has spoken with Dr. Denise and Dr. Coles who suggested Palliative Care vs aggressive treatment. Requested to meet as a family to discuss options.  Plan to meet tomorrow 12/5/17 @ 10am.    All questions were answered in full and Hi denied any needs at this time.  I provided Palliative Care contact number and encouraged him to call " for questions/concerns.   Updated: EVELINA Alvarenga and Palliative Care team.    Plan: Per Dr. Coles/ Further treatment will depend on what the patient wants to do and the nature of the lesion, i.e., whether or not it involves the hypopharynx and would be considered resectable. Family meeting 12/5/17 @ 10am.      Recommendations: NA, for today.     Thank you for allowing Palliative Care to participate in this patient's care. Please feel free to call [phone] with any questions or concerns.

## 2017-12-04 NOTE — CARE PLAN
Problem: Bronchopulmonary Hygiene:  Goal: Increase mobilization of retained secretions  Outcome: PROGRESSING AS EXPECTED  Heated aerosol 4/28%

## 2017-12-04 NOTE — PROGRESS NOTES
"S: More alert and oriented today.  Reverted to sinus but now back in a-fib.  WBC's decreasing.  Cr increasing.  Pathology-invasive, moderately differentiated SCCA.     O: Blood pressure 140/79, pulse 82, temperature 36.7 °C (98.1 °F), resp. rate 20, height 1.676 m (5' 5.98\"), weight 46.1 kg (101 lb 10.1 oz), SpO2 98 %.  Recent Labs      12/02/17   0545  12/04/17   0815   WBC  23.1*  19.6*   RBC  4.60*  4.61*   HEMOGLOBIN  13.3*  13.4*   HEMATOCRIT  39.6*  39.4*   MCV  86.1  85.5   MCH  28.9  29.1   MCHC  33.6*  34.0   RDW  46.8  48.2   PLATELETCT  437  405   MPV  9.2  9.4     Recent Labs      12/02/17   0545  12/04/17   0600   SODIUM  136  138   POTASSIUM  3.4*  3.8   CHLORIDE  106  114*   CO2  18*  16*   GLUCOSE  152*  129*   BUN  15  35*   CREATININE  0.51  2.04*   CALCIUM  7.9*  7.3*     I/O last 3 completed shifts:  In: 4666 [I.V.:4546]  Out: 1280 [Urine:1030; Drains:250]  Neck-trach site clean    A: POD 3 Tracheostomy, dl with biopsies, esophagoscopy for stage 4 laryngeal SCCA.    P: Explained to patient what he has.  He appears to have some understanding.  Will wait to see how he does with his pneumonia, heart and kidneys before setting up definitve plan.   "

## 2017-12-04 NOTE — THERAPY
SPEECH PATHOLOGY:  Patient currently with NGT to suction due to ileus.  There is a palliative care consult ordered.  Asked by Dr. Denise to hold evaluation at this time.  Will continue to monitor status and proceed with evaluation as patient is medically appropriate.

## 2017-12-04 NOTE — DIETARY
Nutrition Services: Update  Pt is currently NPO x 3 days. Per rounds pt has NG to suction and has an ileus. Family deciding on plan of care. Wt on 12/4 - 46.1 kg via bed scale. Meds, labs, and ADLs reviewed.     PLAN/RECOMMEND -   1) Consider starting nutrition support if within patient's plan of care/wishes.   2) Weekly weights to monitor fluid and nutrition status    RD following

## 2017-12-04 NOTE — PROGRESS NOTES
[facility] Hospitalist Progress Note    Date of Service: 2017    Chief Complaint  85 y.o. male admitted 2017 with stridor, aspiration pneumonia.. Found to have a vocal cord mass with airway compromise. S/p trach with ENT complicated by severe delirium postop and afib with RVR, renal failure.       Interval Problem Update  On amio gtt and now in nsr  Confused but a little more appropriate  Restrained   Bp stable  No fevers  Last bm on 1st  Good uop  Condom cath  No central line  On 4L  No proph  Day 4 for pneumonia  5 day stop on abx placed    Dc doxy- usual shyanne in cx        Mag   New arf- check renal us        Consultants/Specialty  ENT    Disposition  icu  Long discussion with family today regarding plan of care and prognosis  Dnr discussed in detail and son has agrees to DNR  They will further consider treating vs comfort care.   Time spent today discussing DNR is 33min  Total time spent today not including DNR discussion is 66min with greater than 50% of time spent discussing plan of care with family, RN         CT neck:1.  5.3 x 3.7 x 3.9 cm laryngeal mass, likely carcinoma  2.  Enlarged LEFT level 2 neck lymph node  3.  Prominent RIGHT level 3 lymph node  4.  Severe narrowing of the upper airway        Review of Systems   Unable to perform ROS: Medical condition      Physical Exam  Laboratory/Imaging   Hemodynamics  Temp (24hrs), Av.8 °C (98.2 °F), Min:36.6 °C (97.9 °F), Max:37.1 °C (98.7 °F)   Temperature: 36.8 °C (98.2 °F)  Pulse  Av.2  Min: 52  Max: 121 Heart Rate (Monitored): (!) 56  NIBP: 124/56      Respiratory    #Aerosol Therapy / Airway Management: Trache Collar, Aerosol Humidity Temp (celsius): 35 Respiration: 14, Pulse Oximetry: 99 %, O2 Daily Delivery Respiratory : Trache Collar     Work Of Breathing / Effort: Mild  RUL Breath Sounds: Clear, RML Breath Sounds: Clear, RLL Breath Sounds: Diminished, BRYANT Breath Sounds: Clear, LLL Breath Sounds: Diminished    Fluids    Intake/Output  Summary (Last 24 hours) at 12/04/17 0735  Last data filed at 12/04/17 0600   Gross per 24 hour   Intake             3240 ml   Output             1100 ml   Net             2140 ml       Nutrition  Orders Placed This Encounter   Procedures   • Diet NPO     Standing Status:   Standing     Number of Occurrences:   1     Order Specific Question:   Restrict to:     Answer:   Sips with Medications [3]     Physical Exam   Constitutional: He appears well-developed and well-nourished. No distress.   HENT:   Right Ear: External ear normal.   Left Ear: External ear normal.   Mouth/Throat: Oropharynx is clear and moist.   trach   Eyes: Conjunctivae are normal. Right eye exhibits no discharge. Left eye exhibits no discharge.   Neck: No thyromegaly present.   Cardiovascular: Regular rhythm and normal heart sounds.  Exam reveals no friction rub.    No murmur heard.  Pulmonary/Chest: Effort normal. He has no wheezes. He has rales.   Stridor   Abdominal: Soft. Bowel sounds are normal. There is no tenderness. There is no rebound.   Musculoskeletal: He exhibits tenderness. He exhibits no edema.   Lymphadenopathy:     He has cervical adenopathy.   Neurological:   Lethargic and confused   Skin: Skin is warm and dry. No rash noted. He is not diaphoretic. No erythema.   Psychiatric: His affect is blunt and labile. He is agitated and slowed. Cognition and memory are impaired. He expresses impulsivity and inappropriate judgment. He is noncommunicative. He is inattentive.   Nursing note and vitals reviewed.      Recent Labs      12/02/17   0545   WBC  23.1*   RBC  4.60*   HEMOGLOBIN  13.3*   HEMATOCRIT  39.6*   MCV  86.1   MCH  28.9   MCHC  33.6*   RDW  46.8   PLATELETCT  437   MPV  9.2     Recent Labs      12/02/17   0545  12/04/17   0600   SODIUM  136  138   POTASSIUM  3.4*  3.8   CHLORIDE  106  114*   CO2  18*  16*   GLUCOSE  152*  129*   BUN  15  35*   CREATININE  0.51  2.04*   CALCIUM  7.9*  7.3*                      Assessment/Plan      Laryngeal mass   Assessment & Plan    Causing severe narrowing of upper airway.   S/p trach  Family thinking about treatment options.   Have recommended comfort care  Ent following            Community acquired pneumonia of right lung   Assessment & Plan     IV antibiotics  Follow-up blood cultures and respiratory cultures  RT protocol        Metabolic acidosis   Assessment & Plan    Iv fluids          Acute encephalopathy   Assessment & Plan    Postop  Acute metabolic   Supportive care        Atrial fibrillation with RVR (CMS-HCC)   Assessment & Plan    amio gtt  Cards following          Hypokalemia   Assessment & Plan    Replace and trend        Stridor   Assessment & Plan    Plan as above            Reviewed items::  Labs reviewed, Medications reviewed, Radiology images reviewed and EKG reviewed  Romano catheter::  No Romano  DVT prophylaxis pharmacological::  Enoxaparin (Lovenox)  Ulcer Prophylaxis::  Not indicated  Antibiotics:  Treating active infection/contamination beyond 24 hours perioperative coverage

## 2017-12-05 NOTE — CARE PLAN
Problem: Communication  Goal: The ability to communicate needs accurately and effectively will improve    Intervention: Reorient patient to environment as needed  Pt seems to be more alert then previously reported. Pt oriented to place and event. Pt nods understanding however, requires re-orientation per encounter.       Problem: Respiratory:  Goal: Respiratory status will improve  Outcome: PROGRESSING SLOWER THAN EXPECTED  Pt requiring suction 2-3 times per encounter. Thick, pink-tinged secretions suctioned. Strong cough.

## 2017-12-05 NOTE — PROGRESS NOTES
Dr. Dodson paged to update on bladder scan results. Orders to insert lora received. Lora placed without complication and approximately 1000mL of clear yellow urine out within 10 minutes.

## 2017-12-05 NOTE — DISCHARGE PLANNING
Care Transition Team Assessment    Referral: D/C Planning    Intervention: Pt was admitted 11/30/2017.  Palliative Care met with pt's family this AM and pt's family will likely take pt home on hospice care after completion of Antibiotics.  No hospice order at this time.  Will discuss choice with family once there is a hospice order.  Per Palliative Care notes they will meet with family again on Saturday 12/09.    Plan: Pt will likely d/c home with hospice after completion of antibiotics    Information Source  Orientation : Unable to Assess  Information Given By: Other (Comments) (Adult Child)  Informant's Name: Justin Do Jr  Who is responsible for making decisions for patient? : Legal next of kin  Name(s) of Primary Decision Maker: Justin Morales Jr.    Readmission Evaluation  Is this a readmission?: No    Elopement Risk  Legal Hold: No  Ambulatory or Self Mobile in Wheelchair: No-Not an Elopement Risk  Disoriented: No  Psychiatric Symptoms: None  History of Wandering: No  Elopement this Admit: No  Vocalizing Wanting to Leave: No  Displays Behaviors, Body Language Wanting to Leave: No-Not at Risk for Elopement  Elopement Risk: Not at Risk for Elopement    Interdisciplinary Discharge Planning  Does Admitting Nurse Feel This Could be a Complex Discharge?: No  Primary Care Physician: N/A  Lives with - Patient's Self Care Capacity: Adult Children  Patient or legal guardian wants to designate a caregiver (see row info): No  Support Systems: Children, Friends / Neighbors  Housing / Facility: 2 Story Apartment / Condo  Do You Take your Prescribed Medications Regularly: No  Reasons Why Not Taking Medications :  (Per pt is currently not prescribed any medications to take)  Able to Return to Previous ADL's: Yes  Mobility Issues: No  Prior Services: None  Patient Expects to be Discharged to:: Home   Assistance Needed: No    Discharge Preparedness  What is your plan after discharge?: Other (comment) (Likely home with  hospice)  What are your discharge supports?: Child  Prior Functional Level: Needs Assist with Activities of Daily Living, Needs Assist with Medication Management  Difficulity with ADLs: Walking, Toileting, Eating, Dressing, Brushing teeth, Bathing  Difficulity with IADLs: Cooking, Driving, Keeping track of finances, Laundry, Managing medication, Shopping, Using the telephone or computer    Functional Assesment  Prior Functional Level: Needs Assist with Activities of Daily Living, Needs Assist with Medication Management    Finances  Financial Barriers to Discharge: No  Prescription Coverage: Yes    Vision / Hearing Impairment  Vision Impairment : Yes  Right Eye Vision: Impaired, Wears Glasses  Left Eye Vision: Impaired, Wears Glasses  Hearing Impairment : Yes  Hearing Impairment: Hearing Device Not Available, Both Ears  Does Pt Need Special Equipment for the Hearing Impaired?: No    Advance Directive  Advance Directive?: None  Advance Directive offered?: AD Booklet refused    Domestic Abuse  Have you ever been the victim of abuse or violence?: No  Physical Abuse or Sexual Abuse: No  Verbal Abuse or Emotional Abuse: No  Possible Abuse Reported to:: Not Applicable    Discharge Risks or Barriers  Discharge risks or barriers?: Complex medical needs    Anticipated Discharge Information  Anticipated discharge disposition: Hospice, Home  Discharge Address: [address] [place] [address] [place] [address]  Discharge Contact Phone Number: [phone]

## 2017-12-05 NOTE — PALLIATIVE CARE
"Palliative Care follow-up  Family meeting with Justin (patient), Hi (son/Justin Jr) and Justus (son).  Hi was able to review his conversation with both Dr. Coles and Dr. Denise regarding diagnosis, treatment options and prognosis.  -Surgical intervention was not recommended as Dr. Coles did not feel that the patient would survive surgery and Hi stated \"I'm not even going that direction\".  -Chemo/radiation was also not recommended as the patient would not be able to tolerate the treatments. \"I like what Dr. Denise said\" \"take him home and be comfortable\".  Tearfully, Hi stated that Hospice was the final suggestion.  I presented Hospice Services & Philosophy, discussed disease progression, dying process, Hospice choice and discharge plan post PNA treatment (which the family would like to complete).    All questions were answered in full.  Patient and family denied any further needs at this time.  Would like to know stop date of antibiotics and potential discharge time frame.  I reviewed our conversation with Dr. Desai with questions and anticipated outcomes.  Dr. Desai will address the family with rounds.    Updated: Colette Brown, RN (remained in the room during our visit)    Plan: Complete antibiotics for PNA, swallow eval, PT/OT eval.  POLST/AD to be address with next meeting- most likely on Saturday 12/09.  Discharge home with Hospice    Thank you for allowing Palliative Care to participate in this patient's care. Please feel free to call [phone] with any questions or concerns.  "

## 2017-12-05 NOTE — PROGRESS NOTES
"S: Better today.  Mor oriented, CR better and wbc's falling.  Swallow study ordered for tomorrow-spoke to Cheryl.  I suspect the only way the patient will be able to eat again will be to have the surgery.    O: Blood pressure 140/79, pulse 61, temperature 36.7 °C (98.1 °F), resp. rate 18, height 1.676 m (5' 5.98\"), weight 47.8 kg (105 lb 6.1 oz), SpO2 97 %.  Recent Labs      12/04/17   0815  12/05/17   0525   WBC  19.6*  16.2*   RBC  4.61*  4.12*   HEMOGLOBIN  13.4*  12.1*   HEMATOCRIT  39.4*  34.7*   MCV  85.5  84.2   MCH  29.1  29.4   MCHC  34.0  34.9   RDW  48.2  47.3   PLATELETCT  405  378   MPV  9.4  9.7     Recent Labs      12/04/17   0600  12/05/17   0525   SODIUM  138  137   POTASSIUM  3.8  3.3*   CHLORIDE  114*  112   CO2  16*  18*   GLUCOSE  129*  124*   BUN  35*  28*   CREATININE  2.04*  1.03   CALCIUM  7.3*  7.9*     I/O last 3 completed shifts:  In: 5712 [I.V.:5532; Other:60]  Out: 2555 [Urine:2505; Drains:50]  trahc site-clean    A: POD 4 Stage 4 laryngeal cancer    P: Will discuss with son and patient again.   "

## 2017-12-05 NOTE — PROGRESS NOTES
"Interval History:  85-year-old who underwent tracheostomy and direct laryngeal mask of the with biopsies, 12/1/2017 and developed a tachycardia intraoperatively not documented by rhythm strips.  Admitted to CCU.  12/2: /59. Pulse 66. No cardiac arrhythmias per RN.  12/3: /81. Pulse 96. Developed atrial fibrillation around 2 AM started on IV amiodarone and continues to be in atrial fibrillation but at a lower heart rate.  12/4: NSR overnight. No CV complaints.  12/5: Rate controlled atrial fibrillation overnight now SR. No cardiac complaints.    Physical Exam   Blood pressure 140/79, pulse (!) 55, temperature 37.1 °C (98.8 °F), resp. rate 15, height 1.676 m (5' 5.98\"), weight 47.8 kg (105 lb 6.1 oz), SpO2 98 %.    Constitutional: Cachectic. Appears well-developed.   HENT: Normocephalic and atraumatic. No scleral icterus.   Neck: No JVD present.   Cardiovascular: Normal rate. Exam reveals no gallop and no friction rub. No murmur heard.   Pulmonary/Chest: Coarse   Abdominal: S/NT/ND BS+   Musculoskeletal: Exhibits no edema. Pulses present.  Skin: Skin is warm and dry.     ROS: Unable to obtain, patient unable to communicate effectively.        Intake/Output Summary (Last 24 hours) at 12/05/17 0729  Last data filed at 12/05/17 0600   Gross per 24 hour   Intake             4248 ml   Output             2055 ml   Net             2193 ml       Recent Labs      12/04/17   0815  12/05/17   0525   WBC  19.6*  16.2*   RBC  4.61*  4.12*   HEMOGLOBIN  13.4*  12.1*   HEMATOCRIT  39.4*  34.7*   MCV  85.5  84.2   MCH  29.1  29.4   MCHC  34.0  34.9   RDW  48.2  47.3   PLATELETCT  405  378   MPV  9.4  9.7     Recent Labs      12/04/17   0600  12/05/17   0525   SODIUM  138  137   POTASSIUM  3.8  3.3*   CHLORIDE  114*  112   CO2  16*  18*   GLUCOSE  129*  124*   BUN  35*  28*   CREATININE  2.04*  1.03   CALCIUM  7.3*  7.9*                       No current facility-administered medications on file prior to encounter.      No " current outpatient prescriptions on file prior to encounter.       Current Facility-Administered Medications   Medication Dose Frequency Provider Last Rate Last Dose   • potassium chloride SA (Kdur) tablet 20 mEq  20 mEq BID Yassine Desai D.O.       • amiodarone (CORDARONE) 450 mg in D5W 250 mL Infusion  0.5-1 mg/min Continuous Meleina M. Bryon, PharmD 17 mL/hr at 12/04/17 2041 0.5 mg/min at 12/04/17 2041   • D5W infusion 500 mL  500 mL Continuous Meleina M. Bryon, PharmD 30 mL/hr at 12/05/17 0123 500 mL at 12/05/17 0123   • morphine (pf) 4 mg/ml injection 2 mg  2 mg Q HOUR PRN Yassine Denise M.D.   2 mg at 12/04/17 1821   • haloperidol lactate (HALDOL) injection 2-5 mg  2-5 mg Q4HRS PRN Yassine Denise M.D.   5 mg at 12/04/17 1423   • senna-docusate (PERICOLACE or SENOKOT S) 8.6-50 MG per tablet 2 Tab  2 Tab BID Viki Chapman M.D.   2 Tab at 12/04/17 2041    And   • polyethylene glycol/lytes (MIRALAX) PACKET 1 Packet  1 Packet QDAY PRN Viki Chapman M.D.        And   • magnesium hydroxide (MILK OF MAGNESIA) suspension 30 mL  30 mL QDAY PRN Viki Chapman M.D.        And   • bisacodyl (DULCOLAX) suppository 10 mg  10 mg QDAY PRN Viki Chapman M.D.       • NS infusion   Continuous Viki Chapman M.D. 100 mL/hr at 12/04/17 2226     • ondansetron (ZOFRAN) syringe/vial injection 4 mg  4 mg Q4HRS PRN Viki Chapman M.D.       • ondansetron (ZOFRAN ODT) dispertab 4 mg  4 mg Q4HRS PRN Viki Chapman M.D.       • acetaminophen (TYLENOL) tablet 650 mg  650 mg Q6HRS PRN Viki Chapman M.D.       • cefTRIAXone (ROCEPHIN) syringe 2 g  2 g Q24HRS Yassine Denise M.D.   2 g at 12/04/17 0752   • Respiratory Care per Protocol   Continuous RT Brittani Farfan D.O.         Last reviewed on 12/1/2017  4:57 AM by Alberto Arredondo R.N.  Medications reviewed    Imaging reviewed    ECHO(12/3/2017):  Technically difficult.  Atrial fibrillation with rates  bpm.  No prior study is available for comparison.   Left  ventricular ejection fraction is visually estimated to be 55%.    Reduced right ventricular systolic function.  Moderate mitral regurgitation.  Mild aortic insufficiency.  Estimated right ventricular systolic pressure is at least 40 mmHg.    Impressions:  1. 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 PNA  3. S/P Tracheostomy  4. Delirium  5. PAF now NSR  6. RANJEET    Recommendations:  Continue amiodarone IV  PO Amiodarone taper once able to tolerate PO (Amiodarone 400mg BID x1 week, then 400mg daily x1 week, then 200mg).  ASA 81mg as tolerated for now given poor prognosis and comorbidity  Appreciate palliative consultation.

## 2017-12-05 NOTE — PROGRESS NOTES
[facility] Hospitalist Progress Note    Date of Service: 2017    Chief Complaint  85 y.o. male with stage IV laryngeal cancer admitted 2017 with concern of aspiration and admitted for airway observation and CT scanning and for tracheostomy tube placement.    Interval Problem Update  Awake and mouthing words but has trach and unable to understand him.  He was able to write comprehensible words.  During my evaluation patient had 20 beats of SVT on monitor but was asymptomatic.  Care discussed with RN.    Consultants/Specialty  ENT Dr Coles  Palliative Care    Disposition  Discussing options with family of Hospice        Review of Systems   Constitutional: Positive for malaise/fatigue and weight loss. Negative for chills and fever.   HENT: Positive for sore throat. Negative for congestion.    Respiratory: Negative for shortness of breath.    Cardiovascular: Negative for chest pain, palpitations and leg swelling.   Gastrointestinal: Negative for abdominal pain and nausea.   Genitourinary: Negative for dysuria.   Musculoskeletal: Negative for back pain.   Neurological: Negative for dizziness.   Psychiatric/Behavioral: The patient is not nervous/anxious.       Physical Exam  Laboratory/Imaging   Hemodynamics  Temp (24hrs), Av.9 °C (98.5 °F), Min:36.7 °C (98.1 °F), Max:37.2 °C (99 °F)   Temperature: 36.8 °C (98.3 °F)  Pulse  Av.8  Min: 52  Max: 121 Heart Rate (Monitored): 65  NIBP: 148/62      Respiratory    #Aerosol Therapy / Airway Management: Trache Collar, Aerosol Humidity Temp (celsius): 34.8 Respiration: (!) 23, Pulse Oximetry: 96 %, O2 Daily Delivery Respiratory : Trache Collar     Work Of Breathing / Effort: Mild  RUL Breath Sounds: Clear, RML Breath Sounds: Diminished, RLL Breath Sounds: Diminished, BRYANT Breath Sounds: Clear, LLL Breath Sounds: Diminished    Fluids    Intake/Output Summary (Last 24 hours) at 17 2967  Last data filed at 17 2200   Gross per 24 hour   Intake              3738 ml   Output             3650 ml   Net               88 ml       Nutrition  Orders Placed This Encounter   Procedures   • Diet NPO     Standing Status:   Standing     Number of Occurrences:   1     Order Specific Question:   Restrict to:     Answer:   Sips with Medications [3]     Physical Exam   Constitutional: He appears well-developed and well-nourished. No distress.   HENT:   Head: Normocephalic and atraumatic.   Nose: Nose normal.   Mouth/Throat: Oropharynx is clear and moist.   Eyes: Conjunctivae and EOM are normal. Right eye exhibits no discharge. Left eye exhibits no discharge. No scleral icterus.   Neck: Normal range of motion. No tracheal deviation present.   Cardiovascular: Normal rate, regular rhythm, normal heart sounds and intact distal pulses.    No murmur heard.  Pulses:       Radial pulses are 2+ on the right side, and 2+ on the left side.        Dorsalis pedis pulses are 2+ on the right side, and 2+ on the left side.   Pulmonary/Chest: Effort normal and breath sounds normal. No respiratory distress. He has no wheezes.   Abdominal: Soft. Bowel sounds are normal. He exhibits no distension. There is no tenderness.   Musculoskeletal: He exhibits no edema.   Neurological: He is alert. No cranial nerve deficit.   Skin: Skin is warm. He is not diaphoretic.   Psychiatric: He has a normal mood and affect. His behavior is normal. Thought content normal.   Vitals reviewed.      Recent Labs      12/04/17   0815  12/05/17   0525   WBC  19.6*  16.2*   RBC  4.61*  4.12*   HEMOGLOBIN  13.4*  12.1*   HEMATOCRIT  39.4*  34.7*   MCV  85.5  84.2   MCH  29.1  29.4   MCHC  34.0  34.9   RDW  48.2  47.3   PLATELETCT  405  378   MPV  9.4  9.7     Recent Labs      12/04/17   0600  12/05/17   0525   SODIUM  138  137   POTASSIUM  3.8  3.3*   CHLORIDE  114*  112   CO2  16*  18*   GLUCOSE  129*  124*   BUN  35*  28*   CREATININE  2.04*  1.03   CALCIUM  7.3*  7.9*                      Assessment/Plan     Laryngeal mass    Assessment & Plan    Causing severe narrowing of upper airway.   S/p trach  Family thinking about treatment options.   Have recommended comfort care  Dr Coles, ENT knows patient well.  I have attempted to page Dr Coles 12/5 and will try again in am to discuss his discussion and direction of care.  Palliative care involved.  Hospice likely an option.  Will discuss with Dr Coles if he has discussed tube feeding with patient and his family or if he would agree for Hospice.        Community acquired pneumonia of right lung   Assessment & Plan     IV antibiotics  Likely aspiration as etiology  WBC with slight downtrend.  O2 via trach  Per Dr Coles's note patient had prior aspiration on barium swallow  Need discussion of hospice and allow to eat or a Cortrak.        Metabolic acidosis   Assessment & Plan    Iv fluids        Acute encephalopathy   Assessment & Plan    Resolving  Neurologically intact.  Writes notes and mouths words.  Follows commands.        Atrial fibrillation with RVR (CMS-HCC)   Assessment & Plan    amio drip.  Unable to convert to oral due to c/o aspiration  Cardiology consulting        Hypokalemia   Assessment & Plan    Replace and trend  Check and replace Mg as needed        Stridor   Assessment & Plan    Plan as above            Reviewed items::  Radiology images reviewed, Labs reviewed and Medications reviewed  DVT prophylaxis - mechanical:  SCDs

## 2017-12-05 NOTE — PROGRESS NOTES
Informed Dr. Denise about low urine output. Order was placed for renal ultrasound due to increased creatinine. Per Md, will wait for results of renal ultrasound.

## 2017-12-05 NOTE — THERAPY
SPEECH PATHOLOGY:  Paged by RN who indicated that Dr. Desai would like to have swallow evaluation completed today or tomorrow.  Advised RN that if SLP was not able to get there this afternoon, it would be tomorrow.  She indicated that would be fine.  Thank you.

## 2017-12-06 PROBLEM — J38.3 VOCAL CORD MASS: Status: ACTIVE | Noted: 2017-01-01

## 2017-12-06 NOTE — PROGRESS NOTES
[facility] Hospitalist Progress Note    Date of Service: 2017    Chief Complaint  85 y.o. male with stage IV laryngeal cancer admitted 2017 with concern of aspiration and admitted for airway observation and CT scanning and for tracheostomy tube placement.    Interval Problem Update  Up all night.  Haldol given once overnight.  Nods appropriately and follows commands.  Afebrile.  NPO.  NG tube to low suction.  Denies any neck pain at trach site.  I spoke to the patient's son via phone and doesn't think his dad wants surgery.  We discussed option for going home with Hospice and the son thinks this is what will happen but is not completely sure.    Consultants/Specialty  ENT Dr Coles  Palliative Care    Disposition  Discussing options with family of Hospice        Review of Systems   Constitutional: Positive for malaise/fatigue. Negative for fever.   HENT: Positive for sore throat. Negative for congestion.    Respiratory: Negative for shortness of breath.    Cardiovascular: Negative for chest pain, palpitations and leg swelling.   Gastrointestinal: Negative for abdominal pain and nausea.   Genitourinary: Negative for dysuria.   Musculoskeletal: Negative for back pain.   Neurological: Negative for dizziness.   Psychiatric/Behavioral: The patient is not nervous/anxious.       Physical Exam  Laboratory/Imaging   Hemodynamics  Temp (24hrs), Av.8 °C (98.2 °F), Min:36.2 °C (97.1 °F), Max:37.1 °C (98.8 °F)   Temperature: 37.1 °C (98.8 °F)  Pulse  Av.6  Min: 51  Max: 121 Heart Rate (Monitored): (!) 58  Blood Pressure : (!) 168/72, NIBP: 151/59      Respiratory    #Aerosol Therapy / Airway Management: Trache Collar, Aerosol Humidity Temp (celsius): 35.1 Respiration: 20, Pulse Oximetry: 94 %, O2 Daily Delivery Respiratory : Trache Collar     Work Of Breathing / Effort: Mild  RUL Breath Sounds: Clear, RML Breath Sounds: Diminished, RLL Breath Sounds: Diminished, BRYANT Breath Sounds: Clear, LLL Breath Sounds:  Diminished    Fluids    Intake/Output Summary (Last 24 hours) at 12/06/17 1740  Last data filed at 12/06/17 1400   Gross per 24 hour   Intake             2842 ml   Output             2405 ml   Net              437 ml       Nutrition  Orders Placed This Encounter   Procedures   • Diet NPO     Standing Status:   Standing     Number of Occurrences:   1     Order Specific Question:   Restrict to:     Answer:   Sips with Medications [3]     Physical Exam   Constitutional: He is oriented to person, place, and time. He appears cachectic. He has a sickly appearance. No distress.   HENT:   Head: Normocephalic and atraumatic.   Nose: Nose normal.   Mouth/Throat: Oropharynx is clear and moist.   Eyes: Conjunctivae and EOM are normal. Right eye exhibits no discharge. Left eye exhibits no discharge. No scleral icterus.   Neck: No tracheal deviation present.   Trach site w/o drainage/swelling   Cardiovascular: Normal rate, regular rhythm, normal heart sounds and intact distal pulses.    No murmur heard.  Pulses:       Radial pulses are 2+ on the right side, and 2+ on the left side.        Dorsalis pedis pulses are 2+ on the right side, and 2+ on the left side.   Pulmonary/Chest: Effort normal. No respiratory distress. He has no wheezes. He has rales.   Abdominal: Soft. Bowel sounds are normal. He exhibits no distension. There is no tenderness.   Musculoskeletal: He exhibits no edema.   Lymphadenopathy:     He has no cervical adenopathy.   Neurological: He is alert and oriented to person, place, and time. No cranial nerve deficit.   Skin: Skin is warm. He is not diaphoretic.   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  Rn states redness decreased from time he noted it and I saw it with the RN later.   Psychiatric: He has a normal mood and affect. His behavior is normal.   Vitals reviewed.      Recent Labs      12/04/17   0815  12/05/17   0525  12/06/17   0330   WBC  19.6*  16.2*  12.3*   RBC  4.61*  4.12*  4.09*    HEMOGLOBIN  13.4*  12.1*  12.2*   HEMATOCRIT  39.4*  34.7*  34.1*   MCV  85.5  84.2  83.4   MCH  29.1  29.4  29.8   MCHC  34.0  34.9  35.8*   RDW  48.2  47.3  44.8   PLATELETCT  405  378  358   MPV  9.4  9.7  9.6     Recent Labs      12/04/17   0600  12/05/17   0525  12/06/17   0330   SODIUM  138  137  138   POTASSIUM  3.8  3.3*  2.8*   CHLORIDE  114*  112  109   CO2  16*  18*  21   GLUCOSE  129*  124*  115*   BUN  35*  28*  12   CREATININE  2.04*  1.03  0.48*   CALCIUM  7.3*  7.9*  7.7*                      Assessment/Plan     Laryngeal mass   Assessment & Plan    Causing severe narrowing of upper airway.   S/p trach  Family thinking about treatment options.   Have recommended comfort care  Dr Coles, ENT knows patient well.  Discussed with Dr Coles.  Options discussed with high risk surgery, vs no surgery and Hospice.  Son feels pt wouldn't want surgery but wants his dad to make the decision.  Palliative care involved.  Hospice likely the option.  Will discuss with patient desire of G-tube vs no additional food.        Community acquired pneumonia of right lung   Assessment & Plan     IV antibiotics  Likely aspiration as etiology  WBC with slight downtrend.  O2 via trach  Per Dr Coles's note patient had prior aspiration on barium swallow  Need discussion of hospice and allow to eat or a Cortrak.        Metabolic acidosis   Assessment & Plan    Iv fluids        Acute encephalopathy   Assessment & Plan    Resolving  Neurologically intact.  Writes notes and mouths words.  Follows commands.        Atrial fibrillation with RVR (CMS-HCC)   Assessment & Plan    amio drip.  Unable to convert to oral due to c/o aspiration  Cardiology consulting        Hypokalemia   Assessment & Plan    Replace and trend  Check and replace Mg as needed        Stridor   Assessment & Plan    Plan as above            Reviewed items::  Labs reviewed and Medications reviewed  Romano catheter::  Strict Intake and Output During Sepisis or Shock  and Critically Ill - Requiring Accurate Measurement of Urinary Output  DVT prophylaxis - mechanical:  SCDs

## 2017-12-06 NOTE — PROGRESS NOTES
"Interval History:  85-year-old who underwent tracheostomy and direct laryngeal mask of the with biopsies, 12/1/2017 and developed a tachycardia intraoperatively not documented by rhythm strips.  Admitted to CCU.  12/2: /59. Pulse 66. No cardiac arrhythmias per RN.  12/3: /81. Pulse 96. Developed atrial fibrillation around 2 AM started on IV amiodarone and continues to be in atrial fibrillation but at a lower heart rate.  12/4: NSR overnight. No CV complaints.  12/5: Rate controlled atrial fibrillation overnight now SR. No cardiac complaints.  12/6: Sinus rhythm overnight. No cardiac events.    Physical Exam   Blood pressure 140/79, pulse (!) 59, temperature 36.8 °C (98.2 °F), resp. rate (!) 23, height 1.676 m (5' 5.98\"), weight 47.8 kg (105 lb 6.1 oz), SpO2 90 %.    Constitutional: Cachectic. Appears well-developed.   HENT: Normocephalic and atraumatic. No scleral icterus.   Neck: No JVD present.   Cardiovascular: Normal rate. Exam reveals no gallop and no friction rub. No murmur heard.   Pulmonary/Chest: Coarse   Abdominal: S/NT/ND BS+   Musculoskeletal: Exhibits no edema. Pulses present.  Skin: Skin is warm and dry.     ROS: Unable to obtain, patient unable to communicate effectively.        Intake/Output Summary (Last 24 hours) at 12/06/17 0733  Last data filed at 12/06/17 0200   Gross per 24 hour   Intake             2856 ml   Output             2450 ml   Net              406 ml       Recent Labs      12/04/17   0815  12/05/17   0525  12/06/17   0330   WBC  19.6*  16.2*  12.3*   RBC  4.61*  4.12*  4.09*   HEMOGLOBIN  13.4*  12.1*  12.2*   HEMATOCRIT  39.4*  34.7*  34.1*   MCV  85.5  84.2  83.4   MCH  29.1  29.4  29.8   MCHC  34.0  34.9  35.8*   RDW  48.2  47.3  44.8   PLATELETCT  405  378  358   MPV  9.4  9.7  9.6     Recent Labs      12/04/17   0600  12/05/17   0525  12/06/17   0330   SODIUM  138  137  138   POTASSIUM  3.8  3.3*  2.8*   CHLORIDE  114*  112  109   CO2  16*  18*  21   GLUCOSE  129*  " 124*  115*   BUN  35*  28*  12   CREATININE  2.04*  1.03  0.48*   CALCIUM  7.3*  7.9*  7.7*                       No current facility-administered medications on file prior to encounter.      No current outpatient prescriptions on file prior to encounter.       Current Facility-Administered Medications   Medication Dose Frequency Provider Last Rate Last Dose   • heparin injection 5,000 Units  5,000 Units Q8HRS YASHIRA ReynoldsOSelena   5,000 Units at 12/06/17 0100   • amiodarone (CORDARONE) 450 mg in D5W 250 mL Infusion  0.5-1 mg/min Continuous Meleina M. Bryon, PharmD 17 mL/hr at 12/06/17 0310 0.5 mg/min at 12/06/17 0310   • D5W infusion 500 mL  500 mL Continuous Meleina M. Bryon, PharmD 30 mL/hr at 12/05/17 2121 500 mL at 12/05/17 2121   • morphine (pf) 4 mg/ml injection 2 mg  2 mg Q HOUR PRN Yassine Denise M.D.   2 mg at 12/04/17 1821   • haloperidol lactate (HALDOL) injection 2-5 mg  2-5 mg Q4HRS PRN Yassine Denise M.D.   5 mg at 12/06/17 0309   • senna-docusate (PERICOLACE or SENOKOT S) 8.6-50 MG per tablet 2 Tab  2 Tab BID Viki Chapman M.D.   2 Tab at 12/05/17 1958    And   • polyethylene glycol/lytes (MIRALAX) PACKET 1 Packet  1 Packet QDAY PRN Viki Chapman M.D.        And   • magnesium hydroxide (MILK OF MAGNESIA) suspension 30 mL  30 mL QDAY PRN Viki Chapman M.D.        And   • bisacodyl (DULCOLAX) suppository 10 mg  10 mg QDAY PRN Viki Chapman M.D.       • NS infusion   Continuous YASHIRA ReynoldsO. 50 mL/hr at 12/05/17 2250     • ondansetron (ZOFRAN) syringe/vial injection 4 mg  4 mg Q4HRS PRN Viki Chapman M.D.       • ondansetron (ZOFRAN ODT) dispertab 4 mg  4 mg Q4HRS PRN Viki Chapman M.D.       • acetaminophen (TYLENOL) tablet 650 mg  650 mg Q6HRS PRN Viki Chapman M.D.       • Respiratory Care per Protocol   Continuous RT Brittani Farfan D.O.         Last reviewed on 12/1/2017  4:57 AM by Alberto Arredondo R.N.  Medications reviewed    Imaging  reviewed    ECHO(12/3/2017):  Technically difficult.  Atrial fibrillation with rates  bpm.  No prior study is available for comparison.   Left ventricular ejection fraction is visually estimated to be 55%.    Reduced right ventricular systolic function.  Moderate mitral regurgitation.  Mild aortic insufficiency.  Estimated right ventricular systolic pressure is at least 40 mmHg.    Impressions:  1. 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 PNA  3. S/P Tracheostomy  4. Delirium  5. PAF now NSR  6. RANJEET    Recommendations:  Continue amiodarone IV  PO Amiodarone taper once able to tolerate PO (Amiodarone 400mg BID x1 week, then 400mg daily x1 week, then 200mg).  ASA 81mg as tolerated for now given poor prognosis and comorbidity  Appreciate palliative consultation.

## 2017-12-06 NOTE — PROGRESS NOTES
At 1450 Report called to Torey on Tele 8. Assessment, medications, plan of care and lab results reviewed with new Primary RN. All questions answered. Pt was transported up to Tele 8 by Dee Dee (who was lunch cover).     At 1530, Chito called Tele 8 upon return from lunch to let Torey know that he did anticipate pt being transported while he was off the floor, Chito informed Torey that pt's vaccine status was not addressed yet, that pt was receiving one liter IVPB of KCL and then is to receive maintenance fluid with potassium in it and that patient had lora placed for urinary retention and RN did not get a change to discuss discontinuing lora with ICU MDs before patient left the unit. Torey notified RN that he will start maintenance fluid when IVPB is done and he will address vaccine and lora status with MDs covering Tele 8.

## 2017-12-06 NOTE — THERAPY
SPEECH PATHOLOGY:  Spoke with Dr. Desai this am, and was asked to hold swallow evaluation today.  Dr. Desai and Dr. Coles to meet with patient and family regarding POC related to surgical intervention.  Will hold swallow evaluation per MD request and will complete as deemed medically appropriate.  Thank you.

## 2017-12-06 NOTE — CARE PLAN
Problem: Respiratory:  Goal: Respiratory status will improve  Outcome: PROGRESSING AS EXPECTED      Problem: Mobility  Goal: Risk for activity intolerance will decrease  Outcome: PROGRESSING AS EXPECTED

## 2017-12-06 NOTE — CARE PLAN
Problem: Bronchopulmonary Hygiene:  Goal: Increase mobilization of retained secretions  Outcome: PROGRESSING AS EXPECTED  Respiratory Therapy Update    Interdisciplinary Plan of Care-Goals (Indications)  Bronchopulmonary Hygiene Indications: Difficulty with Secretion Clearance (12/05/17 1444)  Interdisciplinary Plan of Care-Outcomes   Bronchopulmonary Hygiene Outcome: Optimal Hydration with Moderate or Less Sputum Production (12/05/17 1444)       Cough: Productive (12/05/17 1444)  Sputum Amount: Small (12/05/17 1444)  Sputum Color: Tan (12/05/17 1444)  Sputum Consistency: Thick (12/05/17 1444)    O2 (FiO2): 28 (12/05/17 1444)  O2 (LPM): 4 (12/05/17 1444)  O2 Daily Delivery Respiratory : Trache Collar (12/05/17 1444)    Breath Sounds  Pre/Post Intervention: Pre Intervention Assessment (12/05/17 1444)  RUL Breath Sounds: Clear (12/05/17 1444)  RML Breath Sounds: Diminished (12/05/17 1444)  RLL Breath Sounds: Diminished (12/05/17 1444)  BRYANT Breath Sounds: Clear (12/05/17 1444)  LLL Breath Sounds: Diminished (12/05/17 1444)    Events/Summary/Plan: aerosol check (12/05/17 1444)

## 2017-12-06 NOTE — CARE PLAN
Problem: Nutritional:  Goal: Achieve adequate nutritional intake  Patient will advance diet and consume >50% of meals   Outcome: NOT MET  Pt NPO >5 days.  NGT in place to low, continuous suction; inconsistent record(s) of output.   Last BM PTA.  Family and Surgeon to discuss POC.   SLP to assess pt when appropriate with overall POC.   Pt is 85 y.o. and underweight, increasing risk for further malnutrition and complications.    RD following for diet/PO intake and/or nutrition support.

## 2017-12-06 NOTE — PROGRESS NOTES
Dr. Desai notified L arm PIV infusing Amiodarone infiltrated this AM. PIV replaced on opposite arm. MD to assess site, elevate arm for now, no further orders at this time.

## 2017-12-07 NOTE — CARE PLAN
Problem: Bronchopulmonary Hygiene:  Goal: Increase mobilization of retained secretions    Intervention: Perform airway suctioning  Patient requires frequent suction  Intervention: Perform actions to maintain patient airway  Aerosol humidification 4L 28% FiO2

## 2017-12-07 NOTE — PROGRESS NOTES
Magaly Portillo Fall Risk Assessment:     Last Known Fall: No falls  Mobility: Use of assistive device/requires assist of two people  Medications: Cardiovascular or central nervous system meds  Mental Status/LOC/Awareness: Oriented to person and place  Toileting Needs: Incontinence  Volume/Electrolyte Status: NPO greater than 24 hours, Use of IV fluids/tube feeds  Communication/Sensory: Visual (Glasses)/hearing deficit  Behavior: Appropriate behavior  Magaly Portillo Fall Risk Total: 15  Fall Risk Level: HIGH RISK    Universal Fall Precautions:  call light/belongings in reach, bed in low position and locked, wheelchairs and assistive devices out of sight, siderails up x 2, use non-slip footwear, adequate lighting, clutter free and spill free environment, educate on level of risk, educate to call for assistance    Fall Risk Level Interventions:     TRIAL (TELE 8, NEURO, MED JOSEPH 5) High Fall Risk Interventions  Place yellow fall risk ID band on patient: verified  Provide patient/family education based on risk assessment: completed  Educate patient/family to call staff for assistance when getting out of bed: completed  Place fall precaution signage outside patient door: verified  Place patient in room close to nursing station: verified  Utilize bed/chair fall alarm: verified  Notify charge of high risk for huddle: completed    Patient Specific Interventions:     Medication: review medications with patient and family, assess for medications that can be discontinued or dosage decreased, limit combination of prn medications, provide patients who received diuretics/laxatives frequent toileting and assess need for orthostatic hypotension evaluation  Mental Status/LOC/Awareness: reinforce falls education, check on patient hourly, utilize bed/chair fall alarm, reinforce the use of call light and provide activity  Toileting: provide frquent toileting  Volume/Electrolyte Status: ensure patient remains hydrated, administer  medications as ordered for nausea and vomiting, monitor abnormal lab values, ensure IV fluids are appropriate and teach patients to dangle before rising if hypotensive  Communication/Sensory: update plan of care on whiteboard, collaborate with doctor for possible speech therapy consult and for visually impaired patients orient to their room surrounding and do not change their surroundings  Behavioral: encourage patient to voice feelings, engage patient in daily activities, administer medication as ordered and instruct/reinforce fall program rationale  Mobility: schedule physical activity throughout the day, provide comfort measures during transport, dangle prior to standing, utilize bed/chair fall alarm, ensure bed is locked and in lowest position, provide appropriate assistive device, instruct patient to exit bed on their strongest side and collaborate with doctor for possible PT/OT consult

## 2017-12-07 NOTE — DIETARY
"Nutrition Support Cortrak Assessment - Male    Justin Morales Sr. is a 85 y.o. male with admitting DX of Vocal cord mass.    Pertinent History: None on file  Allergies:  Patient has no known allergies.  Height: 167.6 cm (5' 5.98\")  Weight: 58.1 kg (128 lb 1.4 oz)  Weight to Use in Calculations: 44.9 kg (98 lb 15.8 oz)  Ideal Body Weight: 61.7 kg (136 lb)  Percent Ideal Body Weight: 72.8  Body mass index is 20.68 kg/m².     Pertinent Labs: Potassium 2.8, Glucose 126, Creatinine 0.47, Albumin 2.5, Phosphorus 1.9  Last BM: 17  Pertinent Medications: Heparin, Magnesium sulfate, Potassium phosphate, Pericolace, Bowel meds PRN  Pertinent Fluids: NaCl with KCl 50 mL/hr, D5W 30 mL/hr  Surgery / Procedures: Tracheostomy, Direct laryngoscopy with biopsies, Esophagoscopy   Skin:  Surgical Incision neck, Wound skin tear elbow left, wound arm left - not POA; not currently being followed by the wound team    Estimated Needs: MSJ x 1.2 = 1293 kcals/day  Total Calories / day: 1293 - 1493  (Calories / k - 33)  Total Grams Protein / day: 54 - 63  (Grams Protein / k.2 - 1.4)  Total Fluids ml / day: 1125 ml         Assessment / Evaluation:   Visited pt at bedside; pt appears very frail and cachectic.  Pt with laryngeal mass.  Cortrak placed and verified, consult for tube feeding rec'd.    Plan / Recommendation:   · Start Fibersource HN @ 25 mL and advance per protocol to goal rate of 50 mL/hr.  Tube feeding at goal will provide 1440 kcals, 64 grams of protein, and 972 mL of free water per day.  · Fluids per MD.  · PO diet when safe/appropriate and pt can adequately consume per SLP/MD.  · RD to monitor wt and lab trends.  · Monitor for refeeding: Order BMP w/ Mg and Phos x 7 days. Replete K, Phos and Mg prn. Supplement 100 mg Thiamine x 7 days to reduce risk of refeeding.    RD will continue to follow.  "

## 2017-12-07 NOTE — PROGRESS NOTES
"S: Better today.  More alert, kidneys normal, wbc's decreasing.    O: Blood pressure (!) 168/72, pulse (!) 57, temperature 37.1 °C (98.8 °F), resp. rate 20, height 1.676 m (5' 5.98\"), weight 47.8 kg (105 lb 6.1 oz), SpO2 94 %.  Recent Labs      12/04/17   0815  12/05/17   0525  12/06/17   0330   WBC  19.6*  16.2*  12.3*   RBC  4.61*  4.12*  4.09*   HEMOGLOBIN  13.4*  12.1*  12.2*   HEMATOCRIT  39.4*  34.7*  34.1*   MCV  85.5  84.2  83.4   MCH  29.1  29.4  29.8   MCHC  34.0  34.9  35.8*   RDW  48.2  47.3  44.8   PLATELETCT  405  378  358   MPV  9.4  9.7  9.6     Recent Labs      12/04/17   0600  12/05/17   0525  12/06/17   0330   SODIUM  138  137  138   POTASSIUM  3.8  3.3*  2.8*   CHLORIDE  114*  112  109   CO2  16*  18*  21   GLUCOSE  129*  124*  115*   BUN  35*  28*  12   CREATININE  2.04*  1.03  0.48*   CALCIUM  7.3*  7.9*  7.7*     I/O last 3 completed shifts:  In: 4740 [I.V.:4410; Other:180]  Out: 4355 [Urine:3955; Drains:400]  unchanged    A: POD 5 -Stage 4 laryngeal cancer    P: Again discussed options with Justin's son and Dr. Desai.  TF's to be started today.  The atient wants four more days to decide.  Considerable risk with surgery; other options equally unattractive.   "

## 2017-12-07 NOTE — PROGRESS NOTES
Bedside report received. Patient became increasingly agitated overnight requiring Haldol which per night RN did not help. Patient A&O x 4. No signs of pain or distress at this time. Pt currently on 4 L via tracheal mask, rhonchi throughout, requiring frequent suctioning. Potassium as of this morning is 2.8, NS with KCl 40 MEQ running at 50 ml/hr. Pt has cortrak however feeding has not been initiated yet due to concerns about increased residual, NG tube flushed at beginning of shift with no residual, will start fibersource HN at 25ml/hr. Patient also has lora cath in place for urinary retention. POC discussed with patient. Pt verbalizes understanding. Call light and belongings with in reach. Bed locked and in lowest position, alarm and fall precautions in place.

## 2017-12-07 NOTE — CARE PLAN
Problem: Communication  Goal: The ability to communicate needs accurately and effectively will improve  Outcome: PROGRESSING AS EXPECTED  POC discussed with pt. at bedside. All questions and concerns have been addressed at this time. Verbal understanding recieved    Problem: Safety  Goal: Will remain free from falls  Outcome: PROGRESSING AS EXPECTED  Magaly Portillo Fall Risk Assessment:     Last Known Fall: No falls  Mobility: Use of assistive device/requires assist of two people  Medications: Cardiovascular or central nervous system meds  Mental Status/LOC/Awareness: Oriented to person and place  Toileting Needs: Incontinence  Volume/Electrolyte Status: NPO greater than 24 hours, Use of IV fluids/tube feeds  Communication/Sensory: Visual (Glasses)/hearing deficit  Behavior: Appropriate behavior  Magaly Portillo Fall Risk Total: 15  Fall Risk Level: HIGH RISK    Universal Fall Precautions:  call light/belongings in reach, bed in low position and locked, siderails up x 2, adequate lighting, educate on level of risk, educate to call for assistance    Fall Risk Level Interventions:     TRIAL (TELE 8, NEURO, MED JOSEPH 5) High Fall Risk Interventions  Place yellow fall risk ID band on patient: verified  Provide patient/family education based on risk assessment: completed  Educate patient/family to call staff for assistance when getting out of bed: completed  Place fall precaution signage outside patient door: verified  Place patient in room close to nursing station: verified  Utilize bed/chair fall alarm: verified  Notify charge of high risk for huddle: completed    Patient Specific Interventions:     Medication: review medications with patient and family  Mental Status/LOC/Awareness: reinforce falls education, check on patient hourly, utilize bed/chair fall alarm and reinforce the use of call light  Toileting: provide frquent toileting  Volume/Electrolyte Status: monitor abnormal lab values and ensure IV fluids are  appropriate  Communication/Sensory: update plan of care on whiteboard  Behavioral: engage patient in daily activities and administer medication as ordered  Mobility: utilize bed/chair fall alarm, ensure bed is locked and in lowest position, provide appropriate assistive device and instruct patient to exit bed on their strongest side

## 2017-12-07 NOTE — PROGRESS NOTES
[facility] Hospitalist Progress Note    Date of Service: 2017    Chief Complaint  85 y.o. male with stage IV laryngeal cancer admitted 2017 with concern of aspiration and admitted for airway observation and CT scanning and for tracheostomy tube placement.    Interval Problem Update  -Up all night.  Haldol given once overnight.  Nods appropriately and follows commands.  Afebrile.  NPO.  NG tube to low suction.  Denies any neck pain at trach site.  I spoke to the patient's son via phone and doesn't think his dad wants surgery.  We discussed option for going home with Hospice and the son thinks this is what will happen but is not completely sure.    -Pt seen and examined transferred out from ICU overnight. No changes, prognosis guarded.  Will try to start TF today.  ENT following appreciate rec. Palliative following, pt wants four more days as of  before deciding regarding his goal of care     Consultants/Specialty  ENT Dr Coles  Palliative Care    Disposition  Discussing options with family of Hospice        Review of Systems   Constitutional: Positive for malaise/fatigue. Negative for fever.   HENT: Positive for sore throat. Negative for congestion.    Respiratory: Negative for shortness of breath.    Cardiovascular: Negative for chest pain, palpitations and leg swelling.   Gastrointestinal: Negative for abdominal pain and nausea.   Genitourinary: Negative for dysuria.   Musculoskeletal: Negative for back pain.   Neurological: Negative for dizziness.   Psychiatric/Behavioral: The patient is not nervous/anxious.       Physical Exam  Laboratory/Imaging   Hemodynamics  Temp (24hrs), Av.6 °C (99.6 °F), Min:36.9 °C (98.4 °F), Max:38.1 °C (100.6 °F)   Temperature: 36.9 °C (98.4 °F)  Pulse  Av.1  Min: 51  Max: 121 Heart Rate (Monitored): (!) 58  Blood Pressure : (!) 162/76 (rn notified)      Respiratory    #Aerosol Therapy / Airway Management: Trache Collar, Aerosol Humidity Temp (celsius): 35  Respiration: (!) 24, Pulse Oximetry: 95 %, O2 Daily Delivery Respiratory : Trache Collar     Work Of Breathing / Effort: Mild  RUL Breath Sounds: Rhonchi, RML Breath Sounds: Diminished, RLL Breath Sounds: Diminished, BRYANT Breath Sounds: Rhonchi, LLL Breath Sounds: Diminished    Fluids    Intake/Output Summary (Last 24 hours) at 12/07/17 1258  Last data filed at 12/07/17 1100   Gross per 24 hour   Intake             1517 ml   Output             2520 ml   Net            -1003 ml       Nutrition  Orders Placed This Encounter   Procedures   • Diet NPO     Standing Status:   Standing     Number of Occurrences:   1     Order Specific Question:   Restrict to:     Answer:   Sips with Medications [3]   • DIET TUBE FEED     Standing Status:   Standing     Number of Occurrences:   1     Order Specific Question:   Diet     Answer:   Diet Tube Feed [35]     Order Specific Question:   Formula:     Answer:   Fibersource HN [28]     Order Specific Question:   Route     Answer:   NG     Physical Exam   Constitutional: He is oriented to person, place, and time. He appears cachectic. He has a sickly appearance. No distress.   HENT:   Head: Normocephalic and atraumatic.   Nose: Nose normal.   Mouth/Throat: Oropharynx is clear and moist.   Eyes: Conjunctivae and EOM are normal. Right eye exhibits no discharge. Left eye exhibits no discharge. No scleral icterus.   Neck: No tracheal deviation present.   Trach site w/o drainage/swelling   Cardiovascular: Normal rate, regular rhythm, normal heart sounds and intact distal pulses.    No murmur heard.  Pulses:       Radial pulses are 2+ on the right side, and 2+ on the left side.        Dorsalis pedis pulses are 2+ on the right side, and 2+ on the left side.   Pulmonary/Chest: Effort normal. No respiratory distress. He has no wheezes. He has rales.   Abdominal: Soft. Bowel sounds are normal. He exhibits no distension. There is no tenderness.   Musculoskeletal: He exhibits no edema.    Lymphadenopathy:     He has no cervical adenopathy.   Neurological: He is alert and oriented to person, place, and time. No cranial nerve deficit.   Skin: Skin is warm. He is not diaphoretic.   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  Rn states redness decreased from time he noted it and I saw it with the RN later.   Psychiatric: He has a normal mood and affect. His behavior is normal.   Vitals reviewed.      Recent Labs      12/05/17 0525 12/06/17 0330  12/07/17   0305   WBC  16.2*  12.3*  14.9*   RBC  4.12*  4.09*  4.43*   HEMOGLOBIN  12.1*  12.2*  12.7*   HEMATOCRIT  34.7*  34.1*  36.5*   MCV  84.2  83.4  82.4   MCH  29.4  29.8  28.7   MCHC  34.9  35.8*  34.8   RDW  47.3  44.8  41.9   PLATELETCT  378  358  396   MPV  9.7  9.6  9.6     Recent Labs      12/05/17 0525 12/06/17 0330  12/07/17   0305   SODIUM  137  138  137   POTASSIUM  3.3*  2.8*  2.8*   CHLORIDE  112  109  104   CO2  18*  21  24   GLUCOSE  124*  115*  126*   BUN  28*  12  7*   CREATININE  1.03  0.48*  0.47*   CALCIUM  7.9*  7.7*  7.6*                      Assessment/Plan     Laryngeal mass   Assessment & Plan    Causing severe narrowing of upper airway.   S/p trach  Family thinking about treatment options.   Have recommended comfort care  Dr Coles, ENT knows patient well.  Discussed with Dr Coles.  Options discussed with high risk surgery, vs no surgery and Hospice.  Son feels pt wouldn't want surgery but wants his dad to make the decision.  Palliative care involved.  Hospice likely the option.  Will discuss with patient desire of G-tube vs no additional food.        Community acquired pneumonia of right lung   Assessment & Plan     IV antibiotics  Likely aspiration as etiology  WBC with slight downtrend.  O2 via trach  Per Dr Coles's note patient had prior aspiration on barium swallow  Need discussion of hospice and allow to eat or a Cortrak.        Metabolic acidosis   Assessment & Plan    Iv fluids        Acute  encephalopathy   Assessment & Plan    Resolving  Neurologically intact.  Writes notes and mouths words.  Follows commands.        Atrial fibrillation with RVR (CMS-HCC)   Assessment & Plan    amio drip.  Unable to convert to oral due to c/o aspiration  Cardiology following appreciate rec.         Hypokalemia   Assessment & Plan    Replace and trend  Check and replace Mg as needed        Stridor   Assessment & Plan    Plan as above            Reviewed items::  Labs reviewed and Medications reviewed  Romano catheter::  Strict Intake and Output During Sepisis or Shock and Critically Ill - Requiring Accurate Measurement of Urinary Output  DVT prophylaxis - mechanical:  SCDs

## 2017-12-07 NOTE — PROGRESS NOTES
2 RN skin check completed with EVELINA Hill. Bilateral ears pink but blanching. tracheostomy present, sutured in place, unable to lift trach to check site, no signs of break down under collar. Skin tear to left elbow, left arm warm red and swollen from old IV. Right elbow pink and blanching. Coccyx pink and blanching. All other skin intact, no other signs of breakdown.

## 2017-12-07 NOTE — PROGRESS NOTES
Assumed care of pt. at 1900    Bedside report received from EVELINA Mancia   POC discussed with pt. At bedside and Pt. verbalizes understanding.  Pt. Is Awake and AOx 4 , on 4L O2 via tracheosotomy, running SR 63 on the monitor  Call light within reach  with appropriate instructions to call for assistance  Bed locked and in lowest position.

## 2017-12-07 NOTE — PROGRESS NOTES
Cardiology Progress Note               Author: Digna Peralta Date & Time created: 2017  8:43 AM     Interval History:  84 y/o male admitted for tracheostomy and direct laryngeal biopsies for cancer staging, developed tachycardia intraoperatively and was transferred to ICU.    12/3: developed atrial fibrillation, started on IV amiodarone  : converted to SR  : SR remains, BP up, in bed, trach in place, unable to communicate, trying to mouth words, lots of secretions from trach site    Review of Systems   Unable to perform ROS: Medical condition   Constitutional: Negative for fever, malaise/fatigue and weight loss.   Respiratory: Negative for cough and shortness of breath.    Cardiovascular: Negative for chest pain, palpitations, orthopnea, claudication, leg swelling and PND.   Gastrointestinal: Negative for abdominal pain.   Musculoskeletal: Negative for myalgias.   Neurological: Negative for weakness.       Physical Exam   Constitutional: He is oriented to person, place, and time. Vital signs are normal. He appears cachectic. He has a sickly appearance. He appears ill. No distress.   HENT:   Head: Normocephalic and atraumatic.   Eyes: EOM are normal.   Neck: Normal range of motion. No JVD present.       Cardiovascular: Normal rate, regular rhythm, normal heart sounds and intact distal pulses.    No murmur heard.  Pulmonary/Chest: Effort normal. No respiratory distress. He has no wheezes. He has rhonchi in the right lower field and the left lower field. He has no rales.   Abdominal: Soft. Bowel sounds are normal.   Musculoskeletal: He exhibits no edema.   Cachetic appearance with bedrest due to weakness   Neurological: He is alert and oriented to person, place, and time.   Skin: Skin is warm and dry.   Psychiatric: He has a normal mood and affect.   Nursing note and vitals reviewed.      Hemodynamics:  Temp (24hrs), Av.4 °C (99.3 °F), Min:36.2 °C (97.1 °F), Max:38.1 °C (100.6 °F)  Temperature:  37.6 °C (99.7 °F)  Pulse  Av.2  Min: 51  Max: 121Heart Rate (Monitored): (!) 58  Blood Pressure : (!) 178/80, NIBP: 151/59     Respiratory:   #Aerosol Therapy / Airway Management: Trache Collar, Aerosol Humidity Temp (celsius): 35Respiration: (!) 28, Pulse Oximetry: 95 %, O2 Daily Delivery Respiratory : Trache Collar     Work Of Breathing / Effort: Mild  RUL Breath Sounds: Rhonchi, RML Breath Sounds: Rhonchi, RLL Breath Sounds: Diminished, BRYANT Breath Sounds: Rhonchi, LLL Breath Sounds: Diminished  Fluids:     Weight: 58.1 kg (128 lb 1.4 oz)  GI/Nutrition:  Orders Placed This Encounter   Procedures   • Diet NPO     Standing Status:   Standing     Number of Occurrences:   1     Order Specific Question:   Restrict to:     Answer:   Sips with Medications [3]   • DIET TUBE FEED     Standing Status:   Standing     Number of Occurrences:   1     Order Specific Question:   Diet     Answer:   Diet Tube Feed [35]     Order Specific Question:   Formula:     Answer:   Fibersource HN [28]     Order Specific Question:   Route     Answer:   NG     Lab Results:  Recent Labs      17   0525  17   0330  17   0305   WBC  16.2*  12.3*  14.9*   RBC  4.12*  4.09*  4.43*   HEMOGLOBIN  12.1*  12.2*  12.7*   HEMATOCRIT  34.7*  34.1*  36.5*   MCV  84.2  83.4  82.4   MCH  29.4  29.8  28.7   MCHC  34.9  35.8*  34.8   RDW  47.3  44.8  41.9   PLATELETCT  378  358  396   MPV  9.7  9.6  9.6     Recent Labs      17   0525  17   0330  17   0305   SODIUM  137  138  137   POTASSIUM  3.3*  2.8*  2.8*   CHLORIDE  112  109  104   CO2  18*  21  24   GLUCOSE  124*  115*  126*   BUN  28*  12  7*   CREATININE  1.03  0.48*  0.47*   CALCIUM  7.9*  7.7*  7.6*                         Medical Decision Making, by Problem:  Active Hospital Problems    Diagnosis   • Laryngeal mass [J38.7]   • Community acquired pneumonia of right lung [J18.9]   • 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 Afib with RVR  -SR for 3 days on IV amio, continue.  -transition to PO Amiodarone taper once able to tolerate PO (Amiodarone 400mg BID x1 week, then 400mg daily x1 week, then 200mg).  -ASA 81 mg for now, as prognosis is poor, possible hospice candidate     2. HTN,  -poor control due to unable to tolerate PO medication  -recommend IV anti-hypertensive's and improve pain control per hospitalist team    Cardiology will sign off. Please see above note about transition to PO amiodarone when able to tolerate. Thank you for this consultation.    Quality-Core Measures

## 2017-12-07 NOTE — THERAPY
"Physical Therapy Evaluation completed.   Bed Mobility:  Supine to Sit: Moderate Assist  Transfers: Sit to Stand: Minimal Assist  Gait: Level Of Assist: Moderate Assist with Front-Wheel Walker       Plan of Care: Will benefit from Physical Therapy 3 times per week  Discharge Recommendations: Equipment: Will Continue to Assess for Equipment Needs. Post-acute therapy Discharge to home with outpatient or home health for additional skilled therapy services.(will need 24/7 family assist, and are considering hospice)    See \"Rehab Therapy-Acute\" Patient Summary Report for complete documentation.     "

## 2017-12-07 NOTE — PROGRESS NOTES
"S: Sleeping.  WBC's up today to 14.9    O: Blood pressure (!) 162/76, pulse (!) 58, temperature 36.9 °C (98.4 °F), resp. rate (!) 24, height 1.676 m (5' 5.98\"), weight 58.1 kg (128 lb 1.4 oz), SpO2 95 %.  Recent Labs      12/05/17   0525 12/06/17   0330  12/07/17   0305   WBC  16.2*  12.3*  14.9*   RBC  4.12*  4.09*  4.43*   HEMOGLOBIN  12.1*  12.2*  12.7*   HEMATOCRIT  34.7*  34.1*  36.5*   MCV  84.2  83.4  82.4   MCH  29.4  29.8  28.7   MCHC  34.9  35.8*  34.8   RDW  47.3  44.8  41.9   PLATELETCT  378  358  396   MPV  9.7  9.6  9.6     Recent Labs      12/05/17 0525 12/06/17   0330  12/07/17   0305   SODIUM  137  138  137   POTASSIUM  3.3*  2.8*  2.8*   CHLORIDE  112  109  104   CO2  18*  21  24   GLUCOSE  124*  115*  126*   BUN  28*  12  7*   CREATININE  1.03  0.48*  0.47*   CALCIUM  7.9*  7.7*  7.6*     I/O last 3 completed shifts:  In: 3421 [I.V.:3331; Other:60]  Out: 2855 [Urine:2855]  Not examined    A: POD6-Stage 4 Laryngeal CA    P: Awaiting disposition.   "

## 2017-12-08 NOTE — THERAPY
Speech Pathology:  Order for swallow evaluation is still pending; however, patient is currently NPO with tube feeding.  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  Patient is currently trached and remains a high aspiration risk with PO of any kind.  Per notes, patient and family are taking a few days to decide if patient will proceed with surgery (total laryngectomy) or choose to go home with hospice/palliative services.  Will hold swallow evaluation pending family decision and POC.  Discussed with RN.

## 2017-12-08 NOTE — PROGRESS NOTES
Assumed care at 1900, bedside report received from day shift RN. Pt. Is SB on the monitor. Initial assessment completed, orders reviewed, call light within reach, bed alarm in use, and hourly rounding in place. POC addressed with patient, no additional questions at this time.

## 2017-12-08 NOTE — THERAPY
"Occupational Therapy Evaluation completed.   Functional Status:  Mod A supine to sit.  Max A UB/LB dressing.  Min A sit to stand.  Pt walked to doorway using FWW and physical assist for balance.  Pt sat up in chair at end of session.  Plan of Care: Will benefit from Occupational Therapy 3 times per week  Discharge Recommendations:  Equipment: Will Continue to Assess for Equipment Needs.     See \"Rehab Therapy-Acute\" Patient Summary Report for complete documentation.    "

## 2017-12-08 NOTE — FLOWSHEET NOTE
12/07/17 1924   Events/Summary/Plan   Non-Invasive Resp Device Site Inspection Completed Intact   Interdisciplinary Plan of Care-Goals (Indications)   Bronchopulmonary Hygiene Indications Evidence of Retained Secretions   Interdisciplinary Plan of Care-Outcomes    Bronchopulmonary Hygiene Outcome Optimal Hydration with Moderate or Less Sputum Production   Education   Education Yes - Pt. / Family has been Instructed in use of Respiratory Equipment   Aerosol Therapy / Airway Management   #Aerosol Therapy / Airway Management Trache Collar   Aerosol Humidity Temp (celsius) 35   Respiratory WDL   Respiratory (WDL) X   Chest Exam   Work Of Breathing / Effort Mild   Respiration 20   Pulse (!) 53   Breath Sounds   Pre/Post Intervention Pre Intervention Assessment   RUL Breath Sounds Rhonchi   RML Breath Sounds Rhonchi   RLL Breath Sounds Diminished   BRYANT Breath Sounds Rhonchi   LLL Breath Sounds Diminished   Secretions   Cough Productive;Moist   How Sputum Obtained Tracheal;Suction   Sputum Amount Small   Sputum Color White;Yellow   Sputum Consistency Thick   Suction Frequency Suctioned Once or Twice Per Encounter   Oxygen   Pulse Oximetry 95 %   O2 (LPM) 4   O2 (FiO2) 28   O2 Daily Delivery Respiratory  Trache Collar

## 2017-12-08 NOTE — PROGRESS NOTES
Magaly Portillo Fall Risk Assessment:     Last Known Fall: No falls  Mobility: Use of assistive device/requires assist of two people  Medications: Cardiovascular or central nervous system meds  Mental Status/LOC/Awareness: Oriented to person and place  Toileting Needs: Incontinence  Volume/Electrolyte Status: NPO greater than 24 hours, Use of IV fluids/tube feeds  Communication/Sensory: Visual (Glasses)/hearing deficit  Behavior: Appropriate behavior  Magaly Portillo Fall Risk Total: 15  Fall Risk Level: HIGH RISK    Universal Fall Precautions:  call light/belongings in reach, bed in low position and locked, wheelchairs and assistive devices out of sight, siderails up x 2, use non-slip footwear, adequate lighting, clutter free and spill free environment, educate on level of risk, educate to call for assistance    Fall Risk Level Interventions:     TRIAL (TELE 8, NEURO, MED JOSEPH 5) High Fall Risk Interventions  Place yellow fall risk ID band on patient: verified  Provide patient/family education based on risk assessment: completed  Educate patient/family to call staff for assistance when getting out of bed: completed  Place fall precaution signage outside patient door: verified  Place patient in room close to nursing station: verified  Utilize bed/chair fall alarm: verified  Notify charge of high risk for huddle: completed    Patient Specific Interventions:     Medication: review medications with patient and family and limit combination of prn medications  Mental Status/LOC/Awareness: reorient patient, reinforce falls education, check on patient hourly, utilize bed/chair fall alarm and reinforce the use of call light  Toileting: monitor intake and output/use of appropriate interventions  Volume/Electrolyte Status: ensure patient remains hydrated, monitor abnormal lab values and ensure IV fluids are appropriate  Communication/Sensory: update plan of care on whiteboard  Behavioral: administer medication as ordered and  instruct/reinforce fall program rationale  Mobility: dangle prior to standing, utilize bed/chair fall alarm, ensure bed is locked and in lowest position, provide appropriate assistive device and instruct patient to exit bed on their strongest side

## 2017-12-08 NOTE — CARE PLAN
Problem: Nutritional:  Goal: Nutrition support tolerated and meeting greater than 85% of estimated needs  Outcome: PROGRESSING SLOWER THAN EXPECTED  Pt at 25 mL/hr. Pt tolerating feeds, per RN, to advance today.

## 2017-12-08 NOTE — DISCHARGE PLANNING
Medical Social Work    Per rounds, pt and family are currently deciding between hospice or additional surgery.     Plan: D/C plan in progress.

## 2017-12-08 NOTE — CARE PLAN
Problem: Pain Management  Goal: Pain level will decrease to patient's comfort goal    Intervention: Follow pain managment plan developed in collaboration with patient and Interdisciplinary Team  Pt edu provided on pain mgmt and non pharmacological interventions, pt states understanding, will continue to monitor.

## 2017-12-08 NOTE — PROGRESS NOTES
[facility] Hospitalist Progress Note    Date of Service: 2017    Chief Complaint  85 y.o. male with stage IV laryngeal cancer admitted 2017 with concern of aspiration and admitted for airway observation and CT scanning and for tracheostomy tube placement.    Interval Problem Update  -Up all night.  Haldol given once overnight.  Nods appropriately and follows commands.  Afebrile.  NPO.  NG tube to low suction.  Denies any neck pain at trach site.  I spoke to the patient's son via phone and doesn't think his dad wants surgery.  We discussed option for going home with Hospice and the son thinks this is what will happen but is not completely sure.    -Pt seen and examined transferred out from ICU overnight. No changes, prognosis guarded.  Will try to start TF today.  ENT following appreciate rec. Palliative following, pt wants four more days as of  before deciding regarding his goal of care    - No acute events overnight, started TF yesterday, cardiology converted amio from IV to oral.   Prognosis guarded.      Consultants/Specialty  ENT Dr Coles  Palliative Care    Disposition  Discussing options with family of Hospice        Review of Systems   Constitutional: Positive for malaise/fatigue. Negative for fever.   HENT: Positive for sore throat. Negative for congestion.    Respiratory: Negative for shortness of breath.    Cardiovascular: Negative for chest pain, palpitations and leg swelling.   Gastrointestinal: Negative for abdominal pain and nausea.   Genitourinary: Negative for dysuria.   Musculoskeletal: Negative for back pain.   Neurological: Negative for dizziness.   Psychiatric/Behavioral: The patient is not nervous/anxious.       Physical Exam  Laboratory/Imaging   Hemodynamics  Temp (24hrs), Av.8 °C (98.3 °F), Min:36.5 °C (97.7 °F), Max:37.3 °C (99.2 °F)   Temperature: 36.8 °C (98.3 °F)  Pulse  Av.2  Min: 48  Max: 121    Blood Pressure : 145/70      Respiratory    #Aerosol  Therapy / Airway Management: Trache Collar, Aerosol Humidity Temp (celsius): 35 Respiration: 18, Pulse Oximetry: 93 %, O2 Daily Delivery Respiratory : Trache Collar     Work Of Breathing / Effort: Mild  RUL Breath Sounds: Rhonchi, RML Breath Sounds: Rhonchi, RLL Breath Sounds: Diminished, BRYANT Breath Sounds: Rhonchi, LLL Breath Sounds: Diminished    Fluids    Intake/Output Summary (Last 24 hours) at 12/08/17 1320  Last data filed at 12/08/17 0007   Gross per 24 hour   Intake                0 ml   Output             2450 ml   Net            -2450 ml       Nutrition  Orders Placed This Encounter   Procedures   • Diet NPO     Standing Status:   Standing     Number of Occurrences:   1     Order Specific Question:   Restrict to:     Answer:   Sips with Medications [3]     Physical Exam   Constitutional: He is oriented to person, place, and time. He appears cachectic. He has a sickly appearance. No distress.   HENT:   Head: Normocephalic and atraumatic.   Nose: Nose normal.   Mouth/Throat: Oropharynx is clear and moist.   Eyes: Conjunctivae and EOM are normal. Right eye exhibits no discharge. Left eye exhibits no discharge. No scleral icterus.   Neck: No tracheal deviation present.   Trach site w/o drainage/swelling   Cardiovascular: Normal rate, regular rhythm, normal heart sounds and intact distal pulses.    No murmur heard.  Pulses:       Radial pulses are 2+ on the right side, and 2+ on the left side.        Dorsalis pedis pulses are 2+ on the right side, and 2+ on the left side.   Pulmonary/Chest: Effort normal. No respiratory distress. He has no wheezes. He has rales.   Abdominal: Soft. Bowel sounds are normal. He exhibits no distension. There is no tenderness.   Musculoskeletal: He exhibits no edema.   Lymphadenopathy:     He has no cervical adenopathy.   Neurological: He is alert and oriented to person, place, and time. No cranial nerve deficit.   Skin: Skin is warm. He is not diaphoretic.   Left antecubital area  red from amiodarone infiltration.  Rn states redness decreased from time he noted it and I saw it with the RN later.   Psychiatric: He has a normal mood and affect. His behavior is normal.   Vitals reviewed.      Recent Labs      12/06/17   0330  12/07/17   0305   WBC  12.3*  14.9*   RBC  4.09*  4.43*   HEMOGLOBIN  12.2*  12.7*   HEMATOCRIT  34.1*  36.5*   MCV  83.4  82.4   MCH  29.8  28.7   MCHC  35.8*  34.8   RDW  44.8  41.9   PLATELETCT  358  396   MPV  9.6  9.6     Recent Labs      12/06/17   0330  12/07/17   0305 12/08/17   0931   SODIUM  138  137  139   POTASSIUM  2.8*  2.8*  3.1*   CHLORIDE  109  104  105   CO2  21  24  29   GLUCOSE  115*  126*  116*   BUN  12  7*  7*   CREATININE  0.48*  0.47*  0.39*   CALCIUM  7.7*  7.6*  8.2*                      Assessment/Plan     Laryngeal mass   Assessment & Plan    Causing severe narrowing of upper airway.   S/p trach  Family thinking about treatment options.   Have recommended comfort care  Dr Coles, ENT knows patient well.  Discussed with Dr Coles.  Options discussed with high risk surgery, vs no surgery and Hospice.  Son feels pt wouldn't want surgery but wants his dad to make the decision.  Palliative care involved.  Hospice likely the option.  Will discuss with patient desire of G-tube vs no additional food.        Community acquired pneumonia of right lung   Assessment & Plan     IV antibiotics  Likely aspiration as etiology  WBC with slight downtrend.  O2 via trach  Per Dr Coles's note patient had prior aspiration on barium swallow  Need discussion of hospice and allow to eat or a Cortrak.        Metabolic acidosis   Assessment & Plan    Iv fluids        Acute encephalopathy   Assessment & Plan    Resolving  Neurologically intact.  Writes notes and mouths words.  Follows commands.        Atrial fibrillation with RVR (CMS-HCC)   Assessment & Plan    Converted amio to oral   Cardiology signed off.         Hypokalemia   Assessment & Plan    Replace and  trend  Check and replace Mg as needed        Stridor   Assessment & Plan    Plan as above            Reviewed items::  Labs reviewed and Medications reviewed  Romano catheter::  Strict Intake and Output During Sepisis or Shock and Critically Ill - Requiring Accurate Measurement of Urinary Output  DVT prophylaxis - mechanical:  SCDs

## 2017-12-08 NOTE — PALLIATIVE CARE
Spiritual Care Note           Patient's Name: Justin Morales, .   MRN: [id number]    Age and Gender: 85 y.o. male   YOB: 1932   Place of Residence: [place], [place]   Unit: Telemetry   Room (and Bed): [place] [address]   Ethnicity or Nationality: white   Primary Language: English   Medical Diagnosis(-es)/Procedure(s): laryngeal mass  community acquired pneumonia of right lung  metabolic acidosis  acute encephalopathy, resolving  atrial fibrillation with RVR  hypokalemia  stridor   Alevism Affiliation: unknown   Code Status: DNR    Date of Admission: 11/30/2017    Length of Stay: 7 days   Date of Visit: 12/8/2017        Situation/Reason for Visit:  Patient followed by palliative care.  Received message from Eusebio Costa, that patient was requesting a .    Background:  See above diagnoses.    Observations:  Patient partially sitting up in bed, alert, pleasant.    Summary of Interaction/Conversation with Patient and/or Family/Friends/Others:  Patient unable to speak due to trach, but answered yes or no questions.  He indicated that he is still uncertain what direction to go.  He indicated he wanted me to pray for him.    Assessment of Cultural/Social/Emotional/Spiritual Issues:  Seeking spiritual guidance through prayer.    Interventions:  Compassionate presence, emotional support, prayer.    Outcomes:  Spiritual comfort.    Consultations/Referrals:  none    Requests/Recommendations:  none    Continuing Care:  Will continue to follow.      Contact Information:  Chaplain DYLLAN Schaeffer  [phone]   [email or web address]

## 2017-12-08 NOTE — CARE PLAN
Problem: Safety  Goal: Will remain free from injury    Intervention: Provide assistance with mobility  Bed mobility assistance provided q2hrs and PRN, will continue to monitor.

## 2017-12-09 NOTE — PROGRESS NOTES
[facility]ist Progress Note    Date of Service: 2017    Chief Complaint  85 y.o. male with stage IV laryngeal cancer admitted 2017 with concern of aspiration and admitted for airway observation and CT scanning and for tracheostomy tube placement.    Interval Problem Update  -Up all night.  Haldol given once overnight.  Nods appropriately and follows commands.  Afebrile.  NPO.  NG tube to low suction.  Denies any neck pain at trach site.  I spoke to the patient's son via phone and doesn't think his dad wants surgery.  We discussed option for going home with Hospice and the son thinks this is what will happen but is not completely sure.    -Pt seen and examined transferred out from ICU overnight. No changes, prognosis guarded.  Will try to start TF today.  ENT following appreciate rec. Palliative following, pt wants four more days as of  before deciding regarding his goal of care    - No acute events overnight, started TF yesterday, cardiology converted amio from IV to oral.   Prognosis guarded.      -Pt seen and examined, has removed his coretrack by accident last night, doesn't want it back any more. Palliative team to meet with him and son.   pro    Consultants/Specialty  ENT Dr Coles  Palliative Care    Disposition  Discussing options with family of Hospice        Review of Systems   Constitutional: Positive for malaise/fatigue. Negative for fever.   HENT: Positive for sore throat. Negative for congestion.    Respiratory: Negative for shortness of breath.    Cardiovascular: Negative for chest pain, palpitations and leg swelling.   Gastrointestinal: Negative for abdominal pain and nausea.   Genitourinary: Negative for dysuria.   Musculoskeletal: Negative for back pain.   Neurological: Negative for dizziness.   Psychiatric/Behavioral: The patient is not nervous/anxious.       Physical Exam  Laboratory/Imaging   Hemodynamics  Temp (24hrs), Av.7 °C (98.1 °F), Min:36.3 °C (97.4  °F), Max:36.8 °C (98.3 °F)   Temperature: 36.8 °C (98.2 °F)  Pulse  Av.5  Min: 48  Max: 121    Blood Pressure : (!) 167/85      Respiratory    #Aerosol Therapy / Airway Management: Trache Collar, Aerosol Humidity Temp (celsius): 35 Respiration: 17, Pulse Oximetry: 93 %, O2 Daily Delivery Respiratory : Trache Collar     Work Of Breathing / Effort: Mild  RUL Breath Sounds: Rhonchi, RML Breath Sounds: Rhonchi, RLL Breath Sounds: Diminished, BRYANT Breath Sounds: Rhonchi, LLL Breath Sounds: Diminished    Fluids    Intake/Output Summary (Last 24 hours) at 17 1221  Last data filed at 17 0509   Gross per 24 hour   Intake                0 ml   Output             3750 ml   Net            -3750 ml       Nutrition  Orders Placed This Encounter   Procedures   • Diet NPO     Standing Status:   Standing     Number of Occurrences:   1     Order Specific Question:   Restrict to:     Answer:   Sips with Medications [3]     Physical Exam   Constitutional: He is oriented to person, place, and time. He appears cachectic. He has a sickly appearance. No distress.   HENT:   Head: Normocephalic and atraumatic.   Nose: Nose normal.   Mouth/Throat: Oropharynx is clear and moist.   Eyes: Conjunctivae and EOM are normal. Right eye exhibits no discharge. Left eye exhibits no discharge. No scleral icterus.   Neck: No tracheal deviation present.   Trach site w/o drainage/swelling   Cardiovascular: Normal rate, regular rhythm, normal heart sounds and intact distal pulses.    No murmur heard.  Pulses:       Radial pulses are 2+ on the right side, and 2+ on the left side.        Dorsalis pedis pulses are 2+ on the right side, and 2+ on the left side.   Pulmonary/Chest: Effort normal. No respiratory distress. He has no wheezes. He has rales.   Abdominal: Soft. Bowel sounds are normal. He exhibits no distension. There is no tenderness.   Musculoskeletal: He exhibits no edema.   Lymphadenopathy:     He has no cervical adenopathy.    Neurological: He is alert and oriented to person, place, and time. No cranial nerve deficit.   Skin: Skin is warm. He is not diaphoretic.   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  Rn states redness decreased from time he noted it and I saw it with the RN later.   Psychiatric: He has a normal mood and affect. His behavior is normal.   Vitals reviewed.      Recent Labs      12/07/17   0305   WBC  14.9*   RBC  4.43*   HEMOGLOBIN  12.7*   HEMATOCRIT  36.5*   MCV  82.4   MCH  28.7   MCHC  34.8   RDW  41.9   PLATELETCT  396   MPV  9.6     Recent Labs      12/07/17   0305  12/08/17   0931   SODIUM  137  139   POTASSIUM  2.8*  3.1*   CHLORIDE  104  105   CO2  24  29   GLUCOSE  126*  116*   BUN  7*  7*   CREATININE  0.47*  0.39*   CALCIUM  7.6*  8.2*                      Assessment/Plan     Laryngeal mass   Assessment & Plan    Causing severe narrowing of upper airway.   S/p trach  Family thinking about treatment options.   Have recommended comfort care  Dr Coles, ENT knows patient well.  Discussed with Dr Coles.  Options discussed with high risk surgery, vs no surgery and Hospice.  Son feels pt wouldn't want surgery but wants his dad to make the decision.  Palliative care involved.  Hospice likely the option.  Will discuss with patient desire of G-tube vs no additional food.        Community acquired pneumonia of right lung   Assessment & Plan     IV antibiotics  Likely aspiration as etiology  WBC with slight downtrend.  O2 via trach  Per Dr Coles's note patient had prior aspiration on barium swallow  Need discussion of hospice and allow to eat or a Cortrak.        Metabolic acidosis   Assessment & Plan    Iv fluids        Acute encephalopathy   Assessment & Plan    Resolving  Neurologically intact.  Writes notes and mouths words.  Follows commands.        Atrial fibrillation with RVR (CMS-HCC)   Assessment & Plan    Converted amio to oral   Cardiology signed off.   Has removed his TF last night and  doesn't want the coretrack back         Hypokalemia   Assessment & Plan    Replace and trend  Check and replace Mg as needed        Stridor   Assessment & Plan    Plan as above            Reviewed items::  Labs reviewed and Medications reviewed  Romano catheter::  Strict Intake and Output During Sepisis or Shock and Critically Ill - Requiring Accurate Measurement of Urinary Output  DVT prophylaxis - mechanical:  SCDs

## 2017-12-09 NOTE — PROGRESS NOTES
Pt pulled out core track by accident. Spoke to pt regarding benefits of having core track in place for medicine and nutrition. Pt refused new core track until speaking with his son.

## 2017-12-09 NOTE — CARE PLAN
Problem: Bronchopulmonary Hygiene:  Goal: Increase mobilization of retained secretions    Intervention: Perform airway suctioning  Pt. Requiring frequent suctioning  Intervention: Perform actions to maintain patient airway  Humidification 4L 28%

## 2017-12-10 NOTE — PROGRESS NOTES
[facility]ist Progress Note    Date of Service: 12/10/2017    Chief Complaint  85 y.o. male with stage IV laryngeal cancer admitted 11/30/2017 with concern of aspiration and admitted for airway observation and CT scanning and for tracheostomy tube placement.    Interval Problem Update  12/6-Up all night.  Haldol given once overnight.  Nods appropriately and follows commands.  Afebrile.  NPO.  NG tube to low suction.  Denies any neck pain at trach site.  I spoke to the patient's son via phone and doesn't think his dad wants surgery.  We discussed option for going home with Hospice and the son thinks this is what will happen but is not completely sure.    12/7-Pt seen and examined transferred out from ICU overnight. No changes, prognosis guarded.  Will try to start TF today.  ENT following appreciate rec. Palliative following, pt wants four more days as of 12/6 before deciding regarding his goal of care    12/8- No acute events overnight, started TF yesterday, cardiology converted amio from IV to oral.   Prognosis guarded.      12/9-Pt seen and examined, has removed his coretrack by accident last night, doesn't want it back any more. Palliative team to meet with him and son.   pro    12/10-Pt seen and examined, no changes, still not on TF,aas pt has refused to place coretrack in. Seen by ENT and trache changed today, Palliative team to meet with son today regarding goals of care.     Consultants/Specialty  ENT Dr Coles  Palliative Care    Disposition  Discussing options with family of Hospice        Review of Systems   Constitutional: Positive for malaise/fatigue. Negative for fever.   HENT: Positive for sore throat. Negative for congestion.    Respiratory: Negative for shortness of breath.    Cardiovascular: Negative for chest pain, palpitations and leg swelling.   Gastrointestinal: Negative for abdominal pain and nausea.   Genitourinary: Negative for dysuria.   Musculoskeletal: Negative for back pain.    Neurological: Negative for dizziness.   Psychiatric/Behavioral: The patient is not nervous/anxious.       Physical Exam  Laboratory/Imaging   Hemodynamics  Temp (24hrs), Av.7 °C (98.1 °F), Min:36.6 °C (97.8 °F), Max:37 °C (98.6 °F)   Temperature: 36.6 °C (97.8 °F)  Pulse  Av  Min: 48  Max: 121    Blood Pressure : (!) 186/80      Respiratory    #Aerosol Therapy / Airway Management: T-Piece, Aerosol Humidity Temp (celsius): .35 Respiration: 18, Pulse Oximetry: 95 %, O2 Daily Delivery Respiratory : T-Piece     Work Of Breathing / Effort: Mild  RUL Breath Sounds: Clear;Diminished, RML Breath Sounds: Clear;Diminished, RLL Breath Sounds: Clear;Diminished, BRYANT Breath Sounds: Clear;Diminished, LLL Breath Sounds: Clear;Diminished    Fluids  No intake or output data in the 24 hours ending 12/10/17 1302    Nutrition  Orders Placed This Encounter   Procedures   • Diet NPO     Standing Status:   Standing     Number of Occurrences:   1     Order Specific Question:   Restrict to:     Answer:   Sips with Medications [3]     Physical Exam   Constitutional: He is oriented to person, place, and time. He appears cachectic. He has a sickly appearance. No distress.   HENT:   Head: Normocephalic and atraumatic.   Nose: Nose normal.   Mouth/Throat: Oropharynx is clear and moist.   Eyes: Conjunctivae and EOM are normal. Right eye exhibits no discharge. Left eye exhibits no discharge. No scleral icterus.   Neck: No tracheal deviation present.   Trach site w/o drainage/swelling   Cardiovascular: Normal rate, regular rhythm, normal heart sounds and intact distal pulses.    No murmur heard.  Pulses:       Radial pulses are 2+ on the right side, and 2+ on the left side.        Dorsalis pedis pulses are 2+ on the right side, and 2+ on the left side.   Pulmonary/Chest: Effort normal. No respiratory distress. He has no wheezes. He has rales.   Abdominal: Soft. Bowel sounds are normal. He exhibits no distension. There is no tenderness.    Musculoskeletal: He exhibits no edema.   Lymphadenopathy:     He has no cervical adenopathy.   Neurological: He is alert and oriented to person, place, and time. No cranial nerve deficit.   Skin: Skin is warm. He is not diaphoretic.   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  Rn states redness decreased from time he noted it and I saw it with the RN later.   Psychiatric: He has a normal mood and affect. His behavior is normal.   Vitals reviewed.      Recent Labs      12/10/17   0130   WBC  14.9*   RBC  4.93   HEMOGLOBIN  14.3   HEMATOCRIT  42.1   MCV  85.4   MCH  29.0   MCHC  34.0   RDW  45.8   PLATELETCT  427   MPV  10.3     Recent Labs      12/08/17   0931  12/10/17   0130   SODIUM  139  137   POTASSIUM  3.1*  3.5*   CHLORIDE  105  102   CO2  29  25   GLUCOSE  116*  91   BUN  7*  9   CREATININE  0.39*  0.40*   CALCIUM  8.2*  8.8                      Assessment/Plan     Laryngeal mass   Assessment & Plan    Causing severe narrowing of upper airway.   S/p trach  Family thinking about treatment options.   Have recommended comfort care  Dr Coles, ENT knows patient well.  Discussed with Dr Coles.  Options discussed with high risk surgery, vs no surgery and Hospice.  Son feels pt wouldn't want surgery but wants his dad to make the decision.  Palliative care involved.  Hospice likely the option.  Will discuss with patient desire of G-tube vs no additional food.        Community acquired pneumonia of right lung   Assessment & Plan     IV antibiotics  Likely aspiration as etiology  WBC with slight downtrend.  O2 via trach  Per Dr Coles's note patient had prior aspiration on barium swallow  Need discussion of hospice and allow to eat or a Cortrak.        Metabolic acidosis   Assessment & Plan    Iv fluids        Acute encephalopathy   Assessment & Plan    Resolving  Neurologically intact.  Writes notes and mouths words.  Follows commands.        Atrial fibrillation with RVR (CMS-HCC)   Assessment & Plan     Converted amio to oral   Cardiology signed off.   Has removed his TF last night and doesn't want the coretrack back         Hypokalemia   Assessment & Plan    Replace and trend  Check and replace Mg as needed        Stridor   Assessment & Plan    Plan as above            Reviewed items::  Labs reviewed and Medications reviewed  Romano catheter::  Strict Intake and Output During Sepisis or Shock and Critically Ill - Requiring Accurate Measurement of Urinary Output  DVT prophylaxis - mechanical:  SCDs

## 2017-12-10 NOTE — CARE PLAN
Problem: Bronchopulmonary Hygiene:  Goal: Increase mobilization of retained secretions  Tracheostomy Updat      Cough: Non Productive (12/09/17 2000)  Sputum Amount: Unable to Evaluate (12/09/17 1936)  Sputum Color: White;Yellow (12/09/17 0817)  Sputum Consistency: Thick (12/09/17 0817)    O2 (FiO2): 28 (12/10/17 0246)  O2 (LPM): 4 (12/10/17 0246)    Events/Summary/Plan: aerosol check (12/10/17 0246)

## 2017-12-10 NOTE — PROGRESS NOTES
"S: Yesterday trach could not be suctioned.  It was replaced and an x-ray done which suggested that it was in the right place but today hard to suction again.     O: Blood pressure (!) 186/80, pulse (!) 54, temperature 36.6 °C (97.8 °F), resp. rate 16, height 1.676 m (5' 5.98\"), weight 48.3 kg (106 lb 7.7 oz), SpO2 93 %.  Recent Labs      12/10/17   0130   WBC  14.9*   RBC  4.93   HEMOGLOBIN  14.3   HEMATOCRIT  42.1   MCV  85.4   MCH  29.0   MCHC  34.0   RDW  45.8   PLATELETCT  427   MPV  10.3     Recent Labs      12/08/17   0931  12/10/17   0130   SODIUM  139  137   POTASSIUM  3.1*  3.5*   CHLORIDE  105  102   CO2  29  25   GLUCOSE  116*  91   BUN  7*  9   CREATININE  0.39*  0.40*   CALCIUM  8.2*  8.8     I/O last 3 completed shifts:  In: -   Out: 1650 [Urine:1650]  Trach tube in false passage.    A: Stage 4 laryngeal cancer    P: Trach replaced with #6 non cuffed.  This should be more comfortable and not pose any greater risk as the patient is not eating.    "

## 2017-12-11 NOTE — PROGRESS NOTES
Assumed care of pt.  Bedside report received and whiteboard updated. Discussed plan of care for the day and answered questions.  Chart and MAR reviewed.  Call light and personal belongings are within reach.  Bed in low position and locked. Pt has no needs at this time.    Dentures: denies  Glasses: in place  Hearing aides: not here

## 2017-12-11 NOTE — CARE PLAN
Problem: Nutritional:  Goal: Nutrition support tolerated and meeting greater than 85% of estimated needs  Outcome: NOT MET  Pt dislodged cortrak and does not want it replaced.  Pt decided on hospice care today and is now on a regular diet.

## 2017-12-11 NOTE — CARE PLAN
Problem: Safety  Goal: Will remain free from injury  Outcome: PROGRESSING AS EXPECTED  Safety and fall precautions in place, bed in lowest position, treaded socks on, upper bedrails up.  Pt educated on call light and verbalizes understanding.

## 2017-12-11 NOTE — PALLIATIVE CARE
Palliative Care follow-up  Family meeting schedule for 12/10/2017 at 1pm.  Dr. Casas and I arrived at 1pm with EVELINA Mcleod.  Family no call/no show.  Message retrieved this am from office phone they arrived late yesterday afternoon.  Attempt to call matt Do this am- No answer, no voicemail.      13:30- Met with the patient, Hi (son), Justus (son) and briefly Dr. Coles at bedside.  Patient has declined further surgery and would like to return home with Hospice.  Choice form provided and they have elected [facility].  Choice form faxed to [facility] and left with MARCELO Browen.  Dr. Casas informed and comfort care orders written.      Updated: EVELINA Moses, left message for SW and Palliative Care team  Plan: Awaiting meeting with family to discuss Hospice discharge vs ongoing treatment options.      Thank you for allowing Palliative Care to participate in this patient's care. Please feel free to call [phone] with any questions or concerns.

## 2017-12-11 NOTE — PROGRESS NOTES
"S: The patient and family have decided on hospice care.    O: Blood pressure (!) 174/71, pulse (!) 57, temperature 36.5 °C (97.7 °F), resp. rate 16, height 1.676 m (5' 5.98\"), weight 46.6 kg (102 lb 11.8 oz), SpO2 99 %.  Recent Labs      12/10/17   0130  12/11/17   0857   WBC  14.9*  15.8*   RBC  4.93  4.76   HEMOGLOBIN  14.3  13.7*   HEMATOCRIT  42.1  40.1*   MCV  85.4  84.2   MCH  29.0  28.8   MCHC  34.0  34.2   RDW  45.8  45.4   PLATELETCT  427  522*   MPV  10.3  9.9     Recent Labs      12/10/17   0130  12/11/17   0857   SODIUM  137  136   POTASSIUM  3.5*  3.5*   CHLORIDE  102  104   CO2  25  25   GLUCOSE  91  107*   BUN  9  11   CREATININE  0.40*  0.50   CALCIUM  8.8  8.7     I/O last 3 completed shifts:  In: -   Out: 2500 [Urine:2500]  Trach tube in place.    A: Stage 4 laryngeal cancer    P: Will place 6 noncuffed tube tomorrow and try speaking valve.   "

## 2017-12-11 NOTE — THERAPY
Pt has been placed on comfort care and will DC with hospice.  Pt with no further acute OT needs, will DC at this time.

## 2017-12-11 NOTE — DIETARY
Nutrition Services:  Brief Update    Pt was previously on tube feeds.  Pt now Comfort care and on a Regular diet.  RD available PRN.

## 2017-12-11 NOTE — PROGRESS NOTES
Magaly Portillo Fall Risk Assessment:     Last Known Fall: No falls  Mobility: Use of assistive device/requires assist of two people  Medications: Cardiovascular or central nervous system meds  Mental Status/LOC/Awareness: Awake, alert, and oriented to date, place, and person  Toileting Needs: Use of catheters or diversion devices  Volume/Electrolyte Status: Use of IV fluids/tube feeds  Communication/Sensory: Visual (Glasses)/hearing deficit  Behavior: Appropriate behavior  Magaly Portillo Fall Risk Total: 11  Fall Risk Level: MODERATE RISK    Universal Fall Precautions:  call light/belongings in reach, bed in low position and locked, wheelchairs and assistive devices out of sight, use non-slip footwear, siderails up x 2, adequate lighting, clutter free and spill free environment, educate on level of risk, educate to call for assistance    Fall Risk Level Interventions:    TRIAL (TELE 8, NEURO, MED JOSEPH 5) Moderate Fall Risk Interventions  Place yellow fall risk ID band on patient: verified  Provide patient/family education based on risk assessment : completed  Educate patient/family to call staff for assistance when getting out of bed: completed  Place fall precaution signage outside patient door: verified  Utilize bed/chair fall alarm: completedTRIAL (TELE 8, NEURO, iyzico JOSEPH 5) High Fall Risk Interventions  Place yellow fall risk ID band on patient: verified  Provide patient/family education based on risk assessment: completed  Educate patient/family to call staff for assistance when getting out of bed: completed  Place fall precaution signage outside patient door: verified  Place patient in room close to nursing station: verified  Utilize bed/chair fall alarm: verified  Notify charge of high risk for huddle: completed    Patient Specific Interventions:     Medication: review medications with patient and family and limit combination of prn medications  Mental Status/LOC/Awareness: reorient patient, reinforce falls  education, encourage family to stay with patient, check on patient hourly, utilize bed/chair fall alarm, reinforce the use of call light and provide activity  Toileting: provide frquent toileting, monitor intake and output/use of appropriate interventions, instruct patient/family on the use of grab bars, instruct patient/family on the need to call for assistance when toileting and do not leave patient unattended in bathroom/refer to toileting scripting  Volume/Electrolyte Status: ensure patient remains hydrated, administer medications as ordered for nausea and vomiting, monitor abnormal lab values and ensure IV fluids are appropriate  Communication/Sensory: update plan of care on whiteboard, ensure proper positioning when transferrng/ambulating, ensure patient has glasses/contacts and hearing aids/dentures and for visually impaired patients orient to their room surrounding and do not change their surroundings  Behavioral: encourage patient to voice feelings, engage patient in daily activities and instruct/reinforce fall program rationale  Mobility: schedule physical activity throughout the day, provide comfort measures during transport, dangle prior to standing, utilize bed/chair fall alarm, ensure bed is locked and in lowest position, provide appropriate assistive device, instruct patient to exit bed on their strongest side and collaborate with doctor for possible PT/OT consult

## 2017-12-11 NOTE — FLOWSHEET NOTE
12/11/17 0241   Interdisciplinary Plan of Care-Goals (Indications)   Bronchopulmonary Hygiene Indications Evidence of Retained Secretions   Interdisciplinary Plan of Care-Outcomes    Bronchopulmonary Hygiene Outcome Optimal Hydration with Moderate or Less Sputum Production   Education   Education Yes - Pt. / Family has been Instructed in use of Respiratory Equipment   Aerosol Therapy / Airway Management   #Aerosol Therapy / Airway Management T-Piece   Aerosol Humidity Temp (celsius) 35   Respiratory WDL   Respiratory (WDL) X   Chest Exam   Work Of Breathing / Effort Mild   Oximetry   Continuous Oximetry Yes   Oxygen   Pulse Oximetry 95 %   O2 (LPM) 4   O2 (FiO2) 28   O2 Daily Delivery Respiratory  T-Piece

## 2017-12-11 NOTE — PROGRESS NOTES
[facility] Hospitalist Progress Note    Date of Service: 12/11/2017    Chief Complaint  85 y.o. male with stage IV laryngeal cancer admitted 11/30/2017 with concern of aspiration and admitted for airway observation and CT scanning and for tracheostomy tube placement.    Interval Problem Update  12/6-Up all night.  Haldol given once overnight.  Nods appropriately and follows commands.  Afebrile.  NPO.  NG tube to low suction.  Denies any neck pain at trach site.  I spoke to the patient's son via phone and doesn't think his dad wants surgery.  We discussed option for going home with Hospice and the son thinks this is what will happen but is not completely sure.    12/7-Pt seen and examined transferred out from ICU overnight. No changes, prognosis guarded.  Will try to start TF today.  ENT following appreciate rec. Palliative following, pt wants four more days as of 12/6 before deciding regarding his goal of care    12/8- No acute events overnight, started TF yesterday, cardiology converted amio from IV to oral.   Prognosis guarded.      12/9-Pt seen and examined, has removed his coretrack by accident last night, doesn't want it back any more. Palliative team to meet with him and son.   pro    12/10-Pt seen and examined, no changes, still not on TF,aas pt has refused to place coretrack in. Seen by ENT and trache changed today, Palliative team to meet with son today regarding goals of care.     12/11-Pt seen and examined, no change, prognosis guarded. Plan was for son to meet with palliative team yesterday around 1 pm to discuss option hospice vs ongoing treatment.   Son did not show up.   Son was today at bedside, updated on pt condition, he also had a meeting with palliative team and decision was made to transition pt to comfort care and home with hospice  ENT also following and planing of trying the speaking valve tomorrow.     Consultants/Specialty  ENT Dr Coles  Palliative Care  Cardiology     Disposition  Discussing  options with family of Hospice        Review of Systems   Constitutional: Positive for malaise/fatigue. Negative for fever.   HENT: Positive for sore throat. Negative for congestion.    Respiratory: Negative for shortness of breath.    Cardiovascular: Negative for chest pain, palpitations and leg swelling.   Gastrointestinal: Negative for abdominal pain and nausea.   Genitourinary: Negative for dysuria.   Musculoskeletal: Negative for back pain.   Neurological: Negative for dizziness.   Psychiatric/Behavioral: The patient is not nervous/anxious.       Physical Exam  Laboratory/Imaging   Hemodynamics  Temp (24hrs), Av.5 °C (97.7 °F), Min:36.1 °C (97 °F), Max:36.7 °C (98 °F)   Temperature: 36.5 °C (97.7 °F)  Pulse  Av.5  Min: 48  Max: 121    Blood Pressure : (!) 174/71 (Nurse notified.)      Respiratory    #Aerosol Therapy / Airway Management: T-Piece, Aerosol Humidity Temp (celsius): 35 Respiration: 16, Pulse Oximetry: 99 %, O2 Daily Delivery Respiratory : T-Piece     Work Of Breathing / Effort: Mild  RUL Breath Sounds: Rhonchi, RML Breath Sounds: Rhonchi, RLL Breath Sounds: Diminished, BRYANT Breath Sounds: Rhonchi, LLL Breath Sounds: Diminished    Fluids    Intake/Output Summary (Last 24 hours) at 17 1227  Last data filed at 17 0800   Gross per 24 hour   Intake                0 ml   Output             2500 ml   Net            -2500 ml       Nutrition  Orders Placed This Encounter   Procedures   • Diet NPO     Standing Status:   Standing     Number of Occurrences:   1     Order Specific Question:   Restrict to:     Answer:   Sips with Medications [3]     Physical Exam   Constitutional: He is oriented to person, place, and time. He appears cachectic. He has a sickly appearance. No distress.   HENT:   Head: Normocephalic and atraumatic.   Nose: Nose normal.   Mouth/Throat: Oropharynx is clear and moist.   Eyes: Conjunctivae and EOM are normal. Right eye exhibits no discharge. Left eye exhibits no  discharge. No scleral icterus.   Neck: No tracheal deviation present.   Trach site w/o drainage/swelling   Cardiovascular: Normal rate, regular rhythm, normal heart sounds and intact distal pulses.    No murmur heard.  Pulses:       Radial pulses are 2+ on the right side, and 2+ on the left side.        Dorsalis pedis pulses are 2+ on the right side, and 2+ on the left side.   Pulmonary/Chest: Effort normal. No respiratory distress. He has no wheezes. He has rales.   Abdominal: Soft. Bowel sounds are normal. He exhibits no distension. There is no tenderness.   Musculoskeletal: He exhibits no edema.   Lymphadenopathy:     He has no cervical adenopathy.   Neurological: He is alert and oriented to person, place, and time. No cranial nerve deficit.   Skin: Skin is warm. He is not diaphoretic.   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  Rn states redness decreased from time he noted it and I saw it with the RN later.   Psychiatric: He has a normal mood and affect. His behavior is normal.   Vitals reviewed.      Recent Labs      12/10/17   0130  12/11/17   0857   WBC  14.9*  15.8*   RBC  4.93  4.76   HEMOGLOBIN  14.3  13.7*   HEMATOCRIT  42.1  40.1*   MCV  85.4  84.2   MCH  29.0  28.8   MCHC  34.0  34.2   RDW  45.8  45.4   PLATELETCT  427  522*   MPV  10.3  9.9     Recent Labs      12/10/17   0130  12/11/17   0857   SODIUM  137  136   POTASSIUM  3.5*  3.5*   CHLORIDE  102  104   CO2  25  25   GLUCOSE  91  107*   BUN  9  11   CREATININE  0.40*  0.50   CALCIUM  8.8  8.7                      Assessment/Plan     Laryngeal mass   Assessment & Plan    Causing severe narrowing of upper airway.   S/p trach  Family thinking about treatment options.   Have recommended comfort care  Dr Coles, ENT knows patient well.  Discussed with Dr Coles.  Options discussed with high risk surgery, vs no surgery and Hospice.  Son feels pt wouldn't want surgery but wants his dad to make the decision.  Palliative care  involved.  Hospice likely the option.  Will discuss with patient desire of G-tube vs no additional food.        Community acquired pneumonia of right lung   Assessment & Plan     IV antibiotics  Likely aspiration as etiology  WBC with slight downtrend.  O2 via trach  Per Dr Coles's note patient had prior aspiration on barium swallow  Need discussion of hospice and allow to eat or a Cortrak.        Metabolic acidosis   Assessment & Plan    Iv fluids        Acute encephalopathy   Assessment & Plan    Resolving  Neurologically intact.  Writes notes and mouths words.  Follows commands.        Atrial fibrillation with RVR (CMS-HCC)   Assessment & Plan    Still on IV amiodarone since has removed TF and has been refusing placement of coretrack.  Per cardiology can be switched to oral once TF starts, have signed off.           Hypokalemia   Assessment & Plan    Replace and trend  Check and replace Mg as needed        Stridor   Assessment & Plan    Plan as above            Reviewed items::  Labs reviewed and Medications reviewed  Romano catheter::  Strict Intake and Output During Sepisis or Shock and Critically Ill - Requiring Accurate Measurement of Urinary Output  DVT prophylaxis - mechanical:  SCDs

## 2017-12-11 NOTE — CARE PLAN
Problem: Venous Thromboembolism (VTW)/Deep Vein Thrombosis (DVT) Prevention:  Goal: Patient will participate in Venous Thrombosis (VTE)/Deep Vein Thrombosis (DVT)Prevention Measures  Outcome: PROGRESSING AS EXPECTED  Pt educated on VTE. Pt verbalizes understanding of need for VTE prophylaxis

## 2017-12-11 NOTE — PROGRESS NOTES
Magaly Portillo Fall Risk Assessment:     Last Known Fall: No falls  Mobility: Use of assistive device/requires assist of two people  Medications: Cardiovascular or central nervous system meds  Mental Status/LOC/Awareness: Oriented to person and place  Toileting Needs: Use of catheters or diversion devices  Volume/Electrolyte Status: Use of IV fluids/tube feeds  Communication/Sensory: Visual (Glasses)/hearing deficit  Behavior: Appropriate behavior  Magaly Portillo Fall Risk Total: 12  Fall Risk Level: MODERATE RISK    Universal Fall Precautions:  call light/belongings in reach, bed in low position and locked, siderails up x 2, use non-slip footwear, adequate lighting, clutter free and spill free environment, educate on level of risk, educate to call for assistance    Fall Risk Level Interventions:    TRIAL (FClub 8, NEURO, MED JOSEPH 5) Moderate Fall Risk Interventions  Place yellow fall risk ID band on patient: verified  Provide patient/family education based on risk assessment : completed  Educate patient/family to call staff for assistance when getting out of bed: completed  Place fall precaution signage outside patient door: verified  Utilize bed/chair fall alarm: completedTRIAL (TELE 8, NEURO, [employer] JOSEPH 5) High Fall Risk Interventions  Place yellow fall risk ID band on patient: verified  Provide patient/family education based on risk assessment: completed  Educate patient/family to call staff for assistance when getting out of bed: completed  Place fall precaution signage outside patient door: verified  Place patient in room close to nursing station: verified  Utilize bed/chair fall alarm: verified  Notify charge of high risk for huddle: completed    Patient Specific Interventions:     Medication: limit combination of prn medications  Mental Status/LOC/Awareness: not applicable  Toileting: not applicable  Volume/Electrolyte Status: monitor abnormal lab values and ensure IV fluids are  appropriate  Communication/Sensory: not applicable  Behavioral: not applicable  Mobility: utilize bed/chair fall alarm, ensure bed is locked and in lowest position, provide appropriate assistive device, instruct patient to exit bed on their strongest side and collaborate with doctor for possible PT/OT consult

## 2017-12-11 NOTE — FLOWSHEET NOTE
12/10/17 2029   Events/Summary/Plan   Non-Invasive Resp Device Site Inspection Completed Intact   Interdisciplinary Plan of Care-Goals (Indications)   Bronchopulmonary Hygiene Indications Evidence of Retained Secretions   Interdisciplinary Plan of Care-Outcomes    Bronchopulmonary Hygiene Outcome Optimal Hydration with Moderate or Less Sputum Production   Education   Education Yes - Pt. / Family has been Instructed in use of Respiratory Equipment   Aerosol Therapy / Airway Management   #Aerosol Therapy / Airway Management T-Piece   Aerosol Humidity Temp (celsius) 35   Respiratory WDL   Respiratory (WDL) X   Chest Exam   Work Of Breathing / Effort Mild   Secretions   Cough Moist;Productive   How Sputum Obtained Tracheal;Suction   Sputum Amount Moderate   Sputum Color White;Yellow   Sputum Consistency Thick   Suction Frequency Suctioned Once or Twice Per Encounter   Oximetry   Continuous Oximetry Yes   Oxygen   Pulse Oximetry 97 %   O2 (LPM) 4   O2 (FiO2) 28   O2 Daily Delivery Respiratory  T-Piece

## 2017-12-12 NOTE — PALLIATIVE CARE
Palliative Care follow-up  Follow up visit with the patient, anxious to get home.  Collaboration with Diana, Hospice RN and MARCELO regarding meeting with family around 2-3pm today and discharging home possibly tomorrow.  POLST completed, signed and scanned to [facility]  Updated: EVELINA Mancia, Mreon, MARCELO, Diana, Hospice RN and Palliative Care team.      Plan:Discharge home with Hospice once plan in placed and patient medically cleared.    Thank you for allowing Palliative Care to participate in this patient's care. Please feel free to call [phone] with any questions or concerns.

## 2017-12-12 NOTE — PROGRESS NOTES
Patient transferred to R3 via bed with transport and RT at beside during escort. ambu bag with RT.

## 2017-12-12 NOTE — PROGRESS NOTES
Magaly Portillo Fall Risk Assessment:     Last Known Fall: No falls  Mobility: Use of assistive device/requires assist of two people  Medications: No meds  Mental Status/LOC/Awareness: Oriented to person and place  Toileting Needs: Use of assistive device (Bedside commode, bedpan, urinal)  Volume/Electrolyte Status: No problems  Communication/Sensory: Visual (Glasses)/hearing deficit  Behavior: Appropriate behavior  Magaly Portillo Fall Risk Total: 10  Fall Risk Level: LOW RISK    Universal Fall Precautions:  call light/belongings in reach, bed in low position and locked, wheelchairs and assistive devices out of sight, siderails up x 2, use non-slip footwear, adequate lighting, educate on level of risk, clutter free and spill free environment, educate to call for assistance    Fall Risk Level Interventions:   TRIAL (TELE 8, NEURO, MED JOSEPH 5) Low Fall Risk Interventions  Place yellow fall risk ID band on patient: verified  Provide patient/family education based on risk assessment: completed  Educate patient/family to call staff for assistance when getting out of bed: completed  Place fall precaution signage outside patient door: verifiedTRIAL (TELE 8, NEURO, MED JOSEPH 5) Moderate Fall Risk Interventions  Place yellow fall risk ID band on patient: verified  Provide patient/family education based on risk assessment : completed  Educate patient/family to call staff for assistance when getting out of bed: completed  Place fall precaution signage outside patient door: verified  Utilize bed/chair fall alarm: completedTRIAL (TELE 8, NEURO, [employer] JOSEPH 5) High Fall Risk Interventions  Place yellow fall risk ID band on patient: verified  Provide patient/family education based on risk assessment: completed  Educate patient/family to call staff for assistance when getting out of bed: completed  Place fall precaution signage outside patient door: verified  Place patient in room close to nursing station: verified  Utilize bed/chair  fall alarm: verified  Notify charge of high risk for huddle: completed    Patient Specific Interventions:     Medication: review medications with patient and family, assess for medications that can be discontinued or dosage decreased and limit combination of prn medications  Mental Status/LOC/Awareness: reinforce falls education, encourage family to stay with patient, check on patient hourly, utilize bed/chair fall alarm and reinforce the use of call light  Toileting: provide frquent toileting  Volume/Electrolyte Status: ensure patient remains hydrated  Communication/Sensory: update plan of care on whiteboard  Behavioral: not applicable  Mobility: dangle prior to standing, utilize bed/chair fall alarm, ensure bed is locked and in lowest position, provide appropriate assistive device and instruct patient to exit bed on their strongest side

## 2017-12-12 NOTE — CARE PLAN
Problem: Safety  Goal: Will remain free from injury  Outcome: PROGRESSING AS EXPECTED      Problem: Bowel/Gastric:  Goal: Normal bowel function is maintained or improved  Outcome: PROGRESSING AS EXPECTED      Problem: Respiratory:  Goal: Respiratory status will improve  Outcome: PROGRESSING AS EXPECTED      Problem: Skin Integrity  Goal: Risk for impaired skin integrity will decrease  Outcome: PROGRESSING AS EXPECTED

## 2017-12-12 NOTE — DISCHARGE PLANNING
MARCELO spoke with Diana with [facility]. Per Diana, [facility] accepted and Sherrill ([phone]), also with [facility], been trying to get a hold of pt's sons, Justin Worrell And Justus to discuss hospice and DME.     MARCELO then spoke with Sherrill. Per Sherrill, Justin DarnellSelena's home phone number disconnected and mobile vm mailbox not set up yet and she spoke with Justus, but feels Justus does not understand what she is telling him.      MARCELO called Justin Worrell's cell number and spoke with him. Per Justin, spoke with pt's RN, who states pt transferring to [facility], but he does not know which room. Justin also stated he is on his way to [facility] and will be here around 1400 and he requested more information about hospice . MARCELO stated to Justin Worrell  [facility] was trying to get a hold of him and gave him Sherrill's number.     MARCELO spoke with Sherrill again stating she got a hold of Justin Worrell  and she should call him again.     MARCELO spoke with pt's RN. Per RN, pt transferring to [facility] 311 and she already notified [facility] and Justin Worrell

## 2017-12-12 NOTE — PROGRESS NOTES
Assumed care of patient, bedside report received from Arlin. Updated on POC, call light within reach and fall precautions in place. Bed locked and in lowest position. Patient instructed to call for assistance before getting out of bed. All questions answered, no other needs at this time.

## 2017-12-12 NOTE — PROGRESS NOTES
"S: Still do not have #6 nonfenestrated noncuffless. Re-ordered with nursing.  The patient can talk with the trach plugged. I would like to try a passey luca and then a trach mask for 02.    O: Blood pressure (!) 165/68, pulse 63, temperature 36.6 °C (97.8 °F), resp. rate 17, height 1.676 m (5' 5.98\"), weight 47.6 kg (104 lb 15 oz), SpO2 96 %.  Recent Labs      12/10/17   0130  12/11/17   0857   WBC  14.9*  15.8*   RBC  4.93  4.76   HEMOGLOBIN  14.3  13.7*   HEMATOCRIT  42.1  40.1*   MCV  85.4  84.2   MCH  29.0  28.8   MCHC  34.0  34.2   RDW  45.8  45.4   PLATELETCT  427  522*   MPV  10.3  9.9     Recent Labs      12/10/17   0130  12/11/17   0857   SODIUM  137  136   POTASSIUM  3.5*  3.5*   CHLORIDE  102  104   CO2  25  25   GLUCOSE  91  107*   BUN  9  11   CREATININE  0.40*  0.50   CALCIUM  8.8  8.7     I/O last 3 completed shifts:  In: 360 [P.O.:360]  Out: 1850 [Urine:1850]  Trach site clean    A: Stage 4 laryngeal CA-s/p trachesotomy and biopsies    P: SPL eval for passey luca valve.  Change to trach mask if able to tolerate.  Will change trach once more once the right tube arrives.   "

## 2017-12-12 NOTE — CARE PLAN
Problem: Safety  Goal: Will remain free from injury  Outcome: PROGRESSING AS EXPECTED  Discussed with the patient the importance of calling us for assistance prior to getting out of bed in order to help prevent falls. Patient accepting of the information. All questions answered at this time.

## 2017-12-12 NOTE — PROGRESS NOTES
Magaly Portillo Fall Risk Assessment:     Last Known Fall: No falls  Mobility: Use of assistive device/requires assist of two people  Medications: No meds  Mental Status/LOC/Awareness: Oriented to person and place  Toileting Needs: Use of assistive device (Bedside commode, bedpan, urinal)  Volume/Electrolyte Status: No problems  Communication/Sensory: Visual (Glasses)/hearing deficit  Behavior: Appropriate behavior  Magaly Portillo Fall Risk Total: 10  Fall Risk Level: LOW RISK    Universal Fall Precautions:  call light/belongings in reach, bed in low position and locked, siderails up x 2, wheelchairs and assistive devices out of sight, use non-slip footwear, adequate lighting, clutter free and spill free environment, educate on level of risk, educate to call for assistance    Fall Risk Level Interventions:   TRIAL (TELE 8, NEURO, MED JOSEPH 5) Low Fall Risk Interventions  Place yellow fall risk ID band on patient: verified  Provide patient/family education based on risk assessment: completed  Educate patient/family to call staff for assistance when getting out of bed: completed  Place fall precaution signage outside patient door: verifiedTRIAL (TELE 8, NEURO, MED JOSEPH 5) Moderate Fall Risk Interventions  Place yellow fall risk ID band on patient: verified  Provide patient/family education based on risk assessment : completed  Educate patient/family to call staff for assistance when getting out of bed: completed  Place fall precaution signage outside patient door: verified  Utilize bed/chair fall alarm: completedTRIAL (TELE 8, NEURO, [employer] JOSEPH 5) High Fall Risk Interventions  Place yellow fall risk ID band on patient: verified  Provide patient/family education based on risk assessment: completed  Educate patient/family to call staff for assistance when getting out of bed: completed  Place fall precaution signage outside patient door: verified  Place patient in room close to nursing station: verified  Utilize bed/chair  fall alarm: verified  Notify charge of high risk for huddle: completed    Patient Specific Interventions:     Medication: review medications with patient and family, assess for medications that can be discontinued or dosage decreased, limit combination of prn medications and provide patients who received diuretics/laxatives frequent toileting  Mental Status/LOC/Awareness: reorient patient, reinforce falls education, encourage family to stay with patient, check on patient hourly, utilize bed/chair fall alarm, reinforce the use of call light and provide activity  Toileting: consider obtaining elevated toilet seat or bedside commode (BSC), provide frquent toileting, monitor intake and output/use of appropriate interventions, instruct male patients prone to dizziness to void while sitting, instruct patient/family on the use of grab bars, instruct patient/family on the need to call for assistance when toileting and do not leave patient unattended in bathroom/refer to toileting scripting  Volume/Electrolyte Status: advance diet as tolerated, administer medications as ordered for nausea and vomiting and teach patients to dangle before rising if hypotensive  Communication/Sensory: update plan of care on whiteboard, collaborate with doctor for possible speech therapy consult, ensure proper positioning when transferrng/ambulating, ensure patient has glasses/contacts and hearing aids/dentures, for visually impaired patients orient to their room surrounding and do not change their surroundings and have patients with hearing deficit repeat information back to you to ensure proper understanding  Behavioral: collaborate with doctor for possible psych consult, encourage patient to voice feelings, engage patient in daily activities, administer medication as ordered, instruct/reinforce fall program rationale and encourage family to stay with impulsive patients  Mobility: dangle prior to standing, utilize bed/chair fall alarm, ensure  bed is locked and in lowest position and provide appropriate assistive device

## 2017-12-12 NOTE — PROGRESS NOTES
Spoke to patients matt Anderson regarding when he will be available to speak with hospice RN and to notify him of patient being transferred to new room R311. He will be here around 2 pm with his brother Katey.

## 2017-12-12 NOTE — PROGRESS NOTES
Received report from day shift RN. Assumed care of patient. Patient is comfort care and not on monitor. Patient is sitting up in bed with no family present at this time. Patient declines any needs at this time. Plan of care discussed with patient. Bed locked and in the lowest position with call light within reach.

## 2017-12-13 PROBLEM — C14.0 THROAT CANCER (HCC): Status: ACTIVE | Noted: 2017-01-01

## 2017-12-13 PROBLEM — C13.9 SQUAMOUS CELL CANCER OF HYPOPHARYNX (HCC): Status: ACTIVE | Noted: 2017-01-01

## 2017-12-13 NOTE — ASSESSMENT & PLAN NOTE
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 Status post tracheostomy. Patient is refusing tube feeding. Patient chose to be Comfort only. Hospice on board. Patient will go home with home hospice. Arrangements on the way.

## 2017-12-13 NOTE — DISCHARGE PLANNING
Care Transition Team Discharge Planning    Anticipated Discharge Information  Anticipated discharge disposition: Hospice, Home  Discharge Address: [address] [place] [address]  Discharge Contact Phone Number: [phone]              Discharge Plan: [facility]. Called [facility] and updated them on transport time. Called pt's son Justin Morales Jr. #[phone] and updated. Justin will update Justus as well. Justin is looking forward to his father returning home.     POLST form completed, printed and placed in pt's chart for [facility] transport.     Needs:   Pt to d/c home tomorrow via [facility] at 9am with [facility] following.

## 2017-12-13 NOTE — PROGRESS NOTES
[facility] Hospitalist Progress Note    Date of Service: 2017    Chief Complaint  85 y.o. male admitted 2017 with laryngeal CA, aspiration. S/p tracheostomy . Ongoing dysphagia and cortrak issues which patient discontinued and does not want replaced. He is to go home w/ family once Hospice arranged.    Interval Problem Update  Seen and examined. Unable to verbalize due to trach. Follow commands well. Awake and alert. Shakes head yes and no appropriately. Seem to understand the medical condition and the plan. Denies any chest pain or shortness of breath.    Consultants/Specialty  ENT  Palliative care  Hospice ([facility])  Discussed plan of care with RN  Disposition  Home once Hospice arranged        Review of Systems   Unable to perform ROS: Other      Patient non verbal 2/2 t-piece, limited ROS as in Interval Hx   Physical Exam  Laboratory/Imaging   Hemodynamics  Temp (24hrs), Av.2 °C (98.9 °F), Min:37.2 °C (98.9 °F), Max:37.2 °C (98.9 °F)   Temperature: 37.2 °C (98.9 °F)  Pulse  Av.2  Min: 48  Max: 121    Blood Pressure : 146/53      Respiratory    #Aerosol Therapy / Airway Management: T-Piece, Aerosol Humidity Temp (celsius): 35 Respiration: 18, Pulse Oximetry: 93 %, O2 Daily Delivery Respiratory : T-Piece     Work Of Breathing / Effort: Mild  RUL Breath Sounds: Crackles, RML Breath Sounds: Crackles, RLL Breath Sounds: Diminished, BRYANT Breath Sounds: Crackles, LLL Breath Sounds: Diminished    Fluids    Intake/Output Summary (Last 24 hours) at 17 1144  Last data filed at 17 1000   Gross per 24 hour   Intake                0 ml   Output             1300 ml   Net            -1300 ml       Nutrition  Orders Placed This Encounter   Procedures   • Diet Order     Standing Status:   Standing     Number of Occurrences:   1     Order Specific Question:   Diet:     Answer:   Regular [1]     Physical Exam   Constitutional: He appears cachectic. No distress.   Thin and frail   HENT:   Head:  Normocephalic and atraumatic.   Eyes: EOM are normal. Pupils are equal, round, and reactive to light. Right eye exhibits no discharge. Left eye exhibits no discharge.   Neck: No JVD present. No thyromegaly present.       Trach and T piece   Cardiovascular: Normal rate.    irreg   Pulmonary/Chest: Effort normal. No respiratory distress. He has decreased breath sounds. He has no wheezes.   Decreased w/ junky rhonchi   Abdominal: Soft. Bowel sounds are normal. He exhibits no distension.   Musculoskeletal: He exhibits no edema or tenderness.   Neurological: He is alert. No cranial nerve deficit.   Follow commands well. Unable to verbalize due to trach.   Skin: Skin is warm and dry. No erythema.   Psychiatric: He has a normal mood and affect.   And able to assess due to inability to verbalize because of the trach.   Nursing note and vitals reviewed.      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 Poor prognosis. Patient chose care and comfort. Hospice on board. Patient will go home with home hospice.        Community acquired pneumonia of right lung- (present on admission)   Assessment & Plan    Likely aspiration as etiology  Now care and comfort.        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 Status post tracheostomy. Patient is refusing tube feeding. Patient chose to be Comfort only. Hospice on board. Patient will go home  with home hospice. Arrangements on the way.        Metabolic acidosis   Assessment & Plan    Resolved.        Acute encephalopathy   Assessment & Plan    resolved        Atrial fibrillation with RVR (CMS-HCC)   Assessment & Plan    Patient is on care and comfort only.          Hypokalemia   Assessment & Plan    Was initially corrected. Now status care and comfort.        Stridor- (present on admission)   Assessment & Plan    S/p trach, resolved            Reviewed items::  Labs reviewed and Medications reviewed  Romano catheter::  Urinary Tract Retention or Urinary Tract Obstruction (comfort care)  DVT: comfort care.  Ulcer Prophylaxis::  Not indicated

## 2017-12-13 NOTE — PROGRESS NOTES
"S: Did ok with speaking valve.  Different tube still not up-discussed with the nurse.    O: Blood pressure 146/53, pulse 70, temperature 37.2 °C (98.9 °F), resp. rate 18, height 1.676 m (5' 5.98\"), weight 47.6 kg (104 lb 15 oz), SpO2 93 %.  Recent Labs      12/11/17   0857   WBC  15.8*   RBC  4.76   HEMOGLOBIN  13.7*   HEMATOCRIT  40.1*   MCV  84.2   MCH  28.8   MCHC  34.2   RDW  45.4   PLATELETCT  522*   MPV  9.9     Recent Labs      12/11/17   0857   SODIUM  136   POTASSIUM  3.5*   CHLORIDE  104   CO2  25   GLUCOSE  107*   BUN  11   CREATININE  0.50   CALCIUM  8.7     I/O last 3 completed shifts:  In: -   Out: 450 [Urine:450]  Stay stitch removed.    A: Stage 4 laryngeal CA    P:Able to get nonfenestrated noncuffed # 6 shiley and was able to change and removed stay stitch.  Need to follow up in one week in ofice.   "

## 2017-12-13 NOTE — PROGRESS NOTES
Assumed care of pt at 1855. Rc'd report from EVELINA Baker. Pt is Aox4. Pt is up 2 assist to BSC and chair.  Denies pain and nausea. Trach suctioned as needed. Hourly rounding in place. Call light within reach. Bed in lowest, locked position.

## 2017-12-13 NOTE — DISCHARGE PLANNING
Madalyn ACOSTA spoke to [facility], transportation arranged for 12/14/17 at 0900. Notified MARCELO Cameron via voicemail.

## 2017-12-13 NOTE — DISCHARGE PLANNING
Care Transition Team Discharge Planning    Anticipated Discharge Information  Anticipated discharge disposition: Hospice, Home  Discharge Address: [address] [place] [address]  Discharge Contact Phone Number: [phone]              Discharge Plan:  Pt is a new transfer to [facility] from telemetry. Reviewed notes, spoke to [facility] EVELINA Ortiz, she will be meeting with pt's family this morning and will update SW on plan. [facility] has accepted. Family has been difficult to get a hold of therefore no DME has been delivered. Spoke to SLP, pt tolerated speaking valve this morning and attempted to speak to his son over the phone.      F/U with EVELINA Ortiz from [facility], she met with family and plan is to go home tomorrow morning via [facility] to address: [address] [place] [address]. [facility] will be working on getting all DME (including trach supplies) ordered and delivered today. Requested transport time is tomorrow 12/14/17 at 9am.     Completed transport form and [facility] PCS, faxed to [facility] to schedule transport home tomorrow to pt's son's apartment Justin Morales Jr.     Needs:   [facility] arranging [facility] transport for tomorrow, will await updates.     [facility] working on DME and trach supplies.

## 2017-12-13 NOTE — THERAPY
"Speech Language Therapy Passy Madonna Speaking Valve (PMSV) Evaluation completed to address speech  Functional Status:  Fxnl voice to communicate basic wants and needs in a pt with laryngeal cancer, post placement of a cuffless trach.  Pt tolerated pmsv placement with a gravelly, harsh-sounding, but audible, voice to communicate.  Placed call to pt's son and assisted pt in speaking via phone with his son.  Pt is appropriate for short valve trials to improve quality of communication with others.  Pulse oximetry reveals improved O2 sat levels during speaking valve placement.  RN was instructed in valve placement and a note was placed above the pt's HOB re his status.  SW educated re pt's status with pmsv as well; will return as able to ed family prior to pt discharge home.  Recommendations:  Ok for pt to have pmsv placed for short trials when awake with 1:1 supervision.  Plan of Care: Will benefit from Speech Therapy 3 times per week  Post-Acute Therapy: Discharge to home with outpatient or home health for additional skilled therapy services.    See \"Rehab Therapy-Acute\" Patient Summary Report for complete documentation.   "

## 2017-12-13 NOTE — PROGRESS NOTES
[facility] Hospitalist Progress Note    Date of Service: 2017    Chief Complaint  85 y.o. male admitted 2017 with laryngeal CA, aspiration. S/p tracheostomy . Ongoing dysphagia and cortrak issues which patient discontinued and does not want replaced. He is to go home w/ family once Hospice arranged.    Interval Problem Update  Denies pain, SOB  Denies sputum but has copious mauve sputum on gown below t piece    Consultants/Specialty  ENT  Palliative care  Hospice ([facility])    Disposition  Home once Hospice arranged        ROS   Patient non verbal 2/2 t-piece, limited ROS as in Interval Hx   Physical Exam  Laboratory/Imaging   Hemodynamics  Temp (24hrs), Av.7 °C (98 °F), Min:36.6 °C (97.8 °F), Max:36.8 °C (98.2 °F)   Temperature: 36.6 °C (97.8 °F)  Pulse  Av.2  Min: 48  Max: 121    Blood Pressure : (!) 165/68      Respiratory    #Aerosol Therapy / Airway Management: T-Piece, Aerosol Humidity Temp (celsius): 34 Respiration: 17, Pulse Oximetry: 96 %, O2 Daily Delivery Respiratory : T-Piece     Work Of Breathing / Effort: Mild  RUL Breath Sounds: Crackles, RML Breath Sounds: Crackles, RLL Breath Sounds: Diminished, BRYANT Breath Sounds: Crackles, LLL Breath Sounds: Diminished    Fluids    Intake/Output Summary (Last 24 hours) at 17 1712  Last data filed at 17 0600   Gross per 24 hour   Intake              360 ml   Output             1150 ml   Net             -790 ml       Nutrition  Orders Placed This Encounter   Procedures   • Diet Order     Standing Status:   Standing     Number of Occurrences:   1     Order Specific Question:   Diet:     Answer:   Regular [1]     Physical Exam   Constitutional: He appears well-developed. No distress.   Thin and frail   HENT:   Head: Normocephalic and atraumatic.   Eyes: EOM are normal. Pupils are equal, round, and reactive to light. Right eye exhibits no discharge. Left eye exhibits no discharge.   Neck:   Trach and T piece   Cardiovascular: Normal rate.     irreg   Pulmonary/Chest: Effort normal. No respiratory distress. He has no rales. He exhibits no tenderness.   Decreased w/ junky rhonchi   Abdominal: Soft. He exhibits no distension. There is no tenderness.   Musculoskeletal: He exhibits no edema or tenderness.   Neurological: He is alert. No cranial nerve deficit. He exhibits normal muscle tone. Coordination normal.   Appropriate   Skin: Skin is warm and dry. He is not diaphoretic.   Psychiatric: He has a normal mood and affect.   Nursing note and vitals reviewed.      Recent Labs      12/10/17   0130  12/11/17   0857   WBC  14.9*  15.8*   RBC  4.93  4.76   HEMOGLOBIN  14.3  13.7*   HEMATOCRIT  42.1  40.1*   MCV  85.4  84.2   MCH  29.0  28.8   MCHC  34.0  34.2   RDW  45.8  45.4   PLATELETCT  427  522*   MPV  10.3  9.9     Recent Labs      12/10/17   0130  12/11/17   0857   SODIUM  137  136   POTASSIUM  3.5*  3.5*   CHLORIDE  102  104   CO2  25  25   GLUCOSE  91  107*   BUN  9  11   CREATININE  0.40*  0.50   CALCIUM  8.8  8.7                      Assessment/Plan     Laryngeal mass- (present on admission)   Assessment & Plan    C&C  May eat  Home w/ Hospice        Community acquired pneumonia of right lung- (present on admission)   Assessment & Plan    Likely aspiration as etiology  C&C        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  Ulcer Prophylaxis::  Not indicated

## 2017-12-14 PROBLEM — R06.1 STRIDOR: Status: RESOLVED | Noted: 2017-01-01 | Resolved: 2017-01-01

## 2017-12-14 PROBLEM — E87.6 HYPOKALEMIA: Status: RESOLVED | Noted: 2017-01-01 | Resolved: 2017-01-01

## 2017-12-14 PROBLEM — E87.20 METABOLIC ACIDOSIS: Status: RESOLVED | Noted: 2017-01-01 | Resolved: 2017-01-01

## 2017-12-14 NOTE — PROGRESS NOTES
Received bedside report from day shift RN. Mandi x4. Up x2, bed alarm on. Patient requests lap belt for comfort when in chair. Patient denies pain, nausea and vomiting. Tracheostomy in place. In line suctioning PRN for comfort. Romano draining to gravity. Call light within reach. Bed in lowest and locked position. Q2 rounding in place.

## 2017-12-14 NOTE — DISCHARGE INSTRUCTIONS
Discharge Instructions    Discharged to home by ambulance with escort. Discharged via ambulance, hospital escort: Yes.  Special equipment needed: Oxygen    Be sure to schedule a follow-up appointment with your primary care doctor or any specialists as instructed.     Discharge Plan:   Smoking Cessation Offered: Patient Counseled  Influenza Vaccine Indication: Patient Refuses    I understand that a diet low in cholesterol, fat, and sodium is recommended for good health. Unless I have been given specific instructions below for another diet, I accept this instruction as my diet prescription.   Other diet: Regular    Special Instructions: None    · Is patient discharged on Warfarin / Coumadin?   No     · Is patient Post Blood Transfusion?  No    Depression / Suicide Risk    As you are discharged from this [facility], it is important to learn how to keep safe from harming yourself.    Recognize the warning signs:  · Abrupt changes in personality, positive or negative- including increase in energy   · Giving away possessions  · Change in eating patterns- significant weight changes-  positive or negative  · Change in sleeping patterns- unable to sleep or sleeping all the time   · Unwillingness or inability to communicate  · Depression  · Unusual sadness, discouragement and loneliness  · Talk of wanting to die  · Neglect of personal appearance   · Rebelliousness- reckless behavior  · Withdrawal from people/activities they love  · Confusion- inability to concentrate     If you or a loved one observes any of these behaviors or has concerns about self-harm, here's what you can do:  · Talk about it- your feelings and reasons for harming yourself  · Remove any means that you might use to hurt yourself (examples: pills, rope, extension cords, firearm)  · Get professional help from the community (Mental Health, Substance Abuse, psychological counseling)  · Do not be alone:Call your Safe Contact- someone whom you trust  who will be there for you.  · Call your local CRISIS HOTLINE 244-7444 or 043-807-9854  · Call your local Children's Mobile Crisis Response Team [place] (556) 379-0102 or www.SocialShield  · Call the toll free National Suicide Prevention Hotlines   · National Suicide Prevention Lifeline 617-704-BHGC (7367)  · National [place] Line Network 800-SUICIDE (208-3461)

## 2017-12-14 NOTE — CARE PLAN
Problem: Safety  Goal: Will remain free from injury  Patient up x2. Bed alarm on. Bed in lowest and locked position. Call light within reach. Patient calls appropriately. Q2 rounding in place.    Problem: Mobility  Goal: Risk for activity intolerance will decrease  Patient up to chair for 30 minutes. Calls appropriately for assistance. Up x2. Call light within reach. Bed in lowest and locked position, alarm on. Q2 rounding in place.

## 2017-12-14 NOTE — PROGRESS NOTES
Discharged home via [facility]. Iv removed, lora left in place, 02 per trach colllar at 4L. [facility] called son at home prior to discharge to verify that someone would be at his house waiting and everything is ready. Planning for Hospice at home.

## 2017-12-14 NOTE — CARE PLAN
Problem: Safety  Goal: Will remain free from falls  Outcome: PROGRESSING AS EXPECTED  Interventions in place, bed alarm on.

## 2017-12-15 NOTE — DISCHARGE SUMMARY
CHIEF COMPLAINT ON ADMISSION  Chief Complaint   Patient presents with   • Neck Swelling     Per family member, pt. was sent by MD for issues related to throat swelling. Pt's MD Dr. Coles would like pt. to be admitted for observation overnight tonight prior to surgery scheduled to take place tomorrow at 6:30pm.        CODE STATUS  Prior    HPI & HOSPITAL COURSE  This is a 85 y.o. male With history of laryngeal carcinoma and aspiration status post tracheostomy with ongoing dysphagia was brought here for stridor. Trach was done. Squamous cell carcinoma of the larynx for pathology. Was found to have committed for pneumonia. NG tube feeding was recommended by surgeon. However, patient refused. Palliative care consulted. Patient decided not to pursue any aggressive approach. Code status was changed to DNR And comfort only. Hospice was consulted and patient requested to go home with home hospice. Arrangements were made. Patient then was transferred home with home hospice in stable condition.        SPECIFIC OUTPATIENT FOLLOW-UP  As per hospice    DISCHARGE PROBLEM LIST  Active Problems:    Throat cancer (CMS-HCC) POA: Yes    Community acquired pneumonia of right lung POA: Yes    Atrial fibrillation with RVR (CMS-HCC) POA: Unknown    Acute encephalopathy POA: Unknown    Vocal cord mass POA: Yes  Resolved Problems:    Stridor POA: Yes    Hypokalemia POA: Unknown    Metabolic acidosis POA: Unknown      FOLLOW UP  No future appointments.  No follow-up provider specified.    MEDICATIONS ON DISCHARGE  There are no discharge medications for this patient.       DIET  No orders of the defined types were placed in this encounter.      ACTIVITY  As per hospice      CONSULTATIONS  Hospice    PROCEDURES  Tracheostomy    LABORATORY  Lab Results   Component Value Date/Time    SODIUM 136 12/11/2017 08:57 AM    POTASSIUM 3.5 (L) 12/11/2017 08:57 AM    CHLORIDE 104 12/11/2017 08:57 AM    CO2 25 12/11/2017 08:57 AM    GLUCOSE 107 (H)  12/11/2017 08:57 AM    BUN 11 12/11/2017 08:57 AM    CREATININE 0.50 12/11/2017 08:57 AM    CREATININE 0.8 02/08/2009 11:00 PM        Lab Results   Component Value Date/Time    WBC 15.8 (H) 12/11/2017 08:57 AM    HEMOGLOBIN 13.7 (L) 12/11/2017 08:57 AM    HEMATOCRIT 40.1 (L) 12/11/2017 08:57 AM    PLATELETCT 522 (H) 12/11/2017 08:57 AM        Total time of the discharge process exceeds 40 minutes

## 2017-12-23 NOTE — ED NOTES
Pt. Given discharge instructions .. Verbalizes understanding... Pt. Leaving ED per KARLA... Hospice notified of patients return home

## 2017-12-23 NOTE — ED NOTES
Chief Complaint   Patient presents with   • Other     Pt's tracheostomy tube came out during the night.  Pt has history of throat cancer.  SpO2 96% with blow by O2.    Pt bib [facility] from home O2.  Stoma appears closed over.  Pt on hospice.

## 2017-12-23 NOTE — ED PROVIDER NOTES
ED Provider Note    CHIEF COMPLAINT  Chief Complaint   Patient presents with   • Other     Pt's tracheostomy tube came out during the night.  Pt has history of throat cancer.  SpO2 96% with blow by O2.        HPI  Justin Morales is a 85 y.o. male who presents For evaluation of his tracheostomy coming out.  The patient has past history of throat cancer and has a tracheostomy in place.  The patient is currently being treated by hospice.  The patient's tracheostomy came out yesterday.  The patient presents here for evaluation.  The hospice nurses at the bedside and indicates that they're here just to have the tracheostomy replaced.  No acute medical treatment other than that is required due to his underlying condition and placement under hospice care.    REVIEW OF SYSTEMS  See HPI for further details. All other systems negative.    PAST MEDICAL HISTORY  DISCHARGE PROBLEM LIST  Active Problems:    Throat cancer (CMS-HCC) POA: Yes    Community acquired pneumonia of right lung POA: Yes    Atrial fibrillation with RVR (CMS-[facility]) POA: Unknown    Acute encephalopathy POA: Unknown    Vocal cord mass POA: Yes  Resolved Problems:    Stridor POA: Yes    Hypokalemia POA: Unknown    Metabolic acidosis POA: Unknown         FAMILY HISTORY  No family history on file.    SOCIAL HISTORY  The patient is in hospice care     SURGICAL HISTORY  Past Surgical History:   Procedure Laterality Date   • TRACHEOSTOMY  12/1/2017    Procedure: TRACHEOSTOMY;  Surgeon: Yassine Coles M.D.;  Location: [facility];  Service: Ent   • ESOPHAGOSCOPY  12/1/2017    Procedure: ESOPHAGOSCOPY;  Surgeon: Yassine Coles M.D.;  Location: [facility];  Service: Ent   • LARYNGOSCOPY  12/1/2017    Procedure: LARYNGOSCOPY-DIRECT WITH BIOPSY;  Surgeon: Yassine Coles M.D.;  Location: [facility];  Service: Ent   • CATARACT EXTRACTION         CURRENT MEDICATIONS  See nurses notes    ALLERGIES  No Known Allergies    PHYSICAL  "EXAM  VITAL SIGNS: /67   Pulse 70   Temp 37 °C (98.6 °F)   Resp 18   Ht 1.676 m (5' 6\")   Wt 47.2 kg (104 lb)   SpO2 97%   BMI 16.79 kg/m²    Constitutional: A 85-year-old male, thin, cachectic, awake, very weak   HENT: ,Atraumatic, Bilateral external ears normal, tympanic membranes clear, Oropharynx dry, No oral exudates, Nose normal.   Eyes: PERRL, EOMI, Conjunctiva normal, No discharge.   Neck: Tracheostomy stoma is identified  Lymphatic: No lymphadenopathy noted.   Cardiovascular: Normal heart rate, Normal rhythm, No murmurs, No rubs, No gallops.   Thorax & Lungs: Bilateral rhonchi, No respiratory distress, No wheezing, no stridor, no rales. No chest tenderness.   Abdomen: Soft, nontender, nondistended, no organomegaly, positive bowel sounds normal in quality. No guarding or rebound.  Skin: Decreased skin turgor, pink, warm, dry. No rashes, petechiae, purpura. Normal capillary refill.   Back: No tenderness, No CVA tenderness.   Extremities: Intact distal pulses, No edema, No tenderness, No cyanosis, No clubbing. Vascular: Pulses are 2+, symmetric in the upper and lower extremities.  Musculoskeletal: Diffuse arthritic changes with diffuse muscular atrophy. No tenderness to palpation or major deformities noted.   Neurologic: Extremely weak appearing oriented x 3; No gross focal deficits noted.       COURSE & MEDICAL DECISION MAKING  Pertinent Labs & Imaging studies reviewed. (See chart for details)  Procedure note: The patient's stoma was suctioned by respiratory therapy and a tracheostomy tube, #6 Shiley was placed.  No complications noted.    Discussion: At this time, the patient had replacement of tracheostomy.  At this time, the patient is under hospice care.  No further treatment we initiated.  The patient will be transferred back to home for care under hospice.    FINAL IMPRESSION  1. Tracheostomy malfunction (CMS-HCC)    2. Throat cancer (CMS-HCC)           PLAN  1.  Appropriate discharge " instructions given  2.  Further treatment by his primary care physician hospice personnel    Electronically signed by: Guy G Gansert, 12/23/2017 1:19 PM

## 2017-12-23 NOTE — DISCHARGE PLANNING
RN reported that the pt needed a [facility] ride back home. RN confirmed the pts home address and called [facility], setting up transport through [facility] for 1530. SW updated RN and will remain available.

## 2018-01-01 ENCOUNTER — HOME CARE VISIT (OUTPATIENT)
Dept: HOSPICE | Facility: HOSPICE | Age: 83
End: 2018-01-01
Payer: COMMERCIAL

## 2018-01-01 ENCOUNTER — APPOINTMENT (OUTPATIENT)
Dept: HOSPICE | Facility: HOSPICE | Age: 83
End: 2018-01-01
Payer: COMMERCIAL

## 2018-01-01 VITALS
OXYGEN SATURATION: 97 % | RESPIRATION RATE: 30 BRPM | HEART RATE: 95 BPM | DIASTOLIC BLOOD PRESSURE: 68 MMHG | SYSTOLIC BLOOD PRESSURE: 121 MMHG

## 2018-01-01 VITALS
DIASTOLIC BLOOD PRESSURE: 59 MMHG | SYSTOLIC BLOOD PRESSURE: 137 MMHG | HEART RATE: 100 BPM | RESPIRATION RATE: 32 BRPM | OXYGEN SATURATION: 96 %

## 2018-01-01 VITALS — DIASTOLIC BLOOD PRESSURE: 76 MMHG | SYSTOLIC BLOOD PRESSURE: 133 MMHG | HEART RATE: 68 BPM | RESPIRATION RATE: 18 BRPM

## 2018-01-01 VITALS — SYSTOLIC BLOOD PRESSURE: 90 MMHG | RESPIRATION RATE: 24 BRPM | HEART RATE: 92 BPM | DIASTOLIC BLOOD PRESSURE: 50 MMHG

## 2018-01-01 VITALS
RESPIRATION RATE: 28 BRPM | HEART RATE: 64 BPM | OXYGEN SATURATION: 98 % | DIASTOLIC BLOOD PRESSURE: 70 MMHG | SYSTOLIC BLOOD PRESSURE: 119 MMHG

## 2018-01-01 VITALS — OXYGEN SATURATION: 99 % | HEART RATE: 64 BPM | RESPIRATION RATE: 32 BRPM | TEMPERATURE: 98.2 F

## 2018-01-01 VITALS
OXYGEN SATURATION: 98 % | DIASTOLIC BLOOD PRESSURE: 73 MMHG | HEART RATE: 84 BPM | SYSTOLIC BLOOD PRESSURE: 121 MMHG | RESPIRATION RATE: 26 BRPM | TEMPERATURE: 99.3 F

## 2018-01-01 VITALS
DIASTOLIC BLOOD PRESSURE: 54 MMHG | SYSTOLIC BLOOD PRESSURE: 96 MMHG | RESPIRATION RATE: 32 BRPM | OXYGEN SATURATION: 93 % | TEMPERATURE: 98.4 F | HEART RATE: 74 BPM

## 2018-01-01 VITALS
HEART RATE: 76 BPM | WEIGHT: 99 LBS | OXYGEN SATURATION: 99 % | SYSTOLIC BLOOD PRESSURE: 100 MMHG | DIASTOLIC BLOOD PRESSURE: 79 MMHG | BODY MASS INDEX: 15.98 KG/M2 | RESPIRATION RATE: 22 BRPM

## 2018-01-01 VITALS
TEMPERATURE: 98.8 F | HEART RATE: 56 BPM | SYSTOLIC BLOOD PRESSURE: 143 MMHG | DIASTOLIC BLOOD PRESSURE: 75 MMHG | RESPIRATION RATE: 28 BRPM

## 2018-01-01 VITALS
OXYGEN SATURATION: 98 % | TEMPERATURE: 98.6 F | DIASTOLIC BLOOD PRESSURE: 81 MMHG | HEART RATE: 60 BPM | SYSTOLIC BLOOD PRESSURE: 141 MMHG | RESPIRATION RATE: 26 BRPM

## 2018-01-01 VITALS
RESPIRATION RATE: 24 BRPM | HEART RATE: 88 BPM | DIASTOLIC BLOOD PRESSURE: 65 MMHG | TEMPERATURE: 98.2 F | OXYGEN SATURATION: 88 % | SYSTOLIC BLOOD PRESSURE: 106 MMHG

## 2018-01-01 VITALS
OXYGEN SATURATION: 99 % | DIASTOLIC BLOOD PRESSURE: 95 MMHG | SYSTOLIC BLOOD PRESSURE: 149 MMHG | RESPIRATION RATE: 40 BRPM | HEART RATE: 92 BPM

## 2018-01-01 VITALS — RESPIRATION RATE: 24 BRPM | OXYGEN SATURATION: 81 % | TEMPERATURE: 98.9 F | HEART RATE: 85 BPM

## 2018-01-01 VITALS
HEART RATE: 96 BPM | SYSTOLIC BLOOD PRESSURE: 106 MMHG | DIASTOLIC BLOOD PRESSURE: 58 MMHG | RESPIRATION RATE: 36 BRPM | OXYGEN SATURATION: 98 %

## 2018-01-01 VITALS
OXYGEN SATURATION: 97 % | DIASTOLIC BLOOD PRESSURE: 95 MMHG | HEART RATE: 88 BPM | RESPIRATION RATE: 28 BRPM | SYSTOLIC BLOOD PRESSURE: 158 MMHG

## 2018-01-01 VITALS
HEART RATE: 77 BPM | DIASTOLIC BLOOD PRESSURE: 89 MMHG | RESPIRATION RATE: 24 BRPM | SYSTOLIC BLOOD PRESSURE: 143 MMHG | OXYGEN SATURATION: 98 % | TEMPERATURE: 99.3 F

## 2018-01-01 VITALS — RESPIRATION RATE: 20 BRPM | HEART RATE: 91 BPM | TEMPERATURE: 99.3 F | OXYGEN SATURATION: 98 %

## 2018-01-01 VITALS
RESPIRATION RATE: 40 BRPM | DIASTOLIC BLOOD PRESSURE: 73 MMHG | OXYGEN SATURATION: 98 % | HEART RATE: 92 BPM | SYSTOLIC BLOOD PRESSURE: 135 MMHG

## 2018-01-01 VITALS
SYSTOLIC BLOOD PRESSURE: 148 MMHG | RESPIRATION RATE: 28 BRPM | OXYGEN SATURATION: 98 % | TEMPERATURE: 99 F | HEART RATE: 68 BPM | DIASTOLIC BLOOD PRESSURE: 83 MMHG

## 2018-01-01 VITALS
SYSTOLIC BLOOD PRESSURE: 158 MMHG | RESPIRATION RATE: 32 BRPM | DIASTOLIC BLOOD PRESSURE: 74 MMHG | HEART RATE: 72 BPM | OXYGEN SATURATION: 99 %

## 2018-01-01 VITALS
SYSTOLIC BLOOD PRESSURE: 110 MMHG | OXYGEN SATURATION: 99 % | HEART RATE: 60 BPM | RESPIRATION RATE: 20 BRPM | DIASTOLIC BLOOD PRESSURE: 66 MMHG | SYSTOLIC BLOOD PRESSURE: 104 MMHG | HEART RATE: 88 BPM | DIASTOLIC BLOOD PRESSURE: 60 MMHG | RESPIRATION RATE: 20 BRPM

## 2018-01-01 VITALS
SYSTOLIC BLOOD PRESSURE: 123 MMHG | RESPIRATION RATE: 36 BRPM | HEART RATE: 88 BPM | OXYGEN SATURATION: 98 % | DIASTOLIC BLOOD PRESSURE: 60 MMHG

## 2018-01-01 VITALS — RESPIRATION RATE: 40 BRPM | DIASTOLIC BLOOD PRESSURE: 68 MMHG | SYSTOLIC BLOOD PRESSURE: 128 MMHG | HEART RATE: 64 BPM

## 2018-01-01 VITALS — OXYGEN SATURATION: 99 % | RESPIRATION RATE: 28 BRPM | TEMPERATURE: 99.3 F | HEART RATE: 80 BPM

## 2018-01-01 VITALS — RESPIRATION RATE: 16 BRPM | HEART RATE: 80 BPM | OXYGEN SATURATION: 96 % | TEMPERATURE: 99 F

## 2018-01-01 VITALS
OXYGEN SATURATION: 98 % | RESPIRATION RATE: 32 BRPM | DIASTOLIC BLOOD PRESSURE: 66 MMHG | SYSTOLIC BLOOD PRESSURE: 108 MMHG | HEART RATE: 72 BPM

## 2018-01-01 VITALS
RESPIRATION RATE: 24 BRPM | SYSTOLIC BLOOD PRESSURE: 136 MMHG | OXYGEN SATURATION: 97 % | HEART RATE: 60 BPM | DIASTOLIC BLOOD PRESSURE: 62 MMHG

## 2018-01-01 VITALS
OXYGEN SATURATION: 97 % | RESPIRATION RATE: 20 BRPM | HEART RATE: 70 BPM | SYSTOLIC BLOOD PRESSURE: 135 MMHG | DIASTOLIC BLOOD PRESSURE: 70 MMHG

## 2018-01-01 VITALS
OXYGEN SATURATION: 98 % | TEMPERATURE: 98.1 F | RESPIRATION RATE: 32 BRPM | SYSTOLIC BLOOD PRESSURE: 135 MMHG | DIASTOLIC BLOOD PRESSURE: 74 MMHG | HEART RATE: 68 BPM

## 2018-01-01 VITALS — RESPIRATION RATE: 26 BRPM | HEART RATE: 88 BPM | OXYGEN SATURATION: 90 % | TEMPERATURE: 98.1 F

## 2018-01-01 PROCEDURE — S9126 HOSPICE CARE, IN THE HOME, P: HCPCS

## 2018-01-01 PROCEDURE — A4554 DISPOSABLE UNDERPADS: HCPCS

## 2018-01-01 PROCEDURE — A4354 CATH INSERTION TRAY W/BAG: HCPCS

## 2018-01-01 PROCEDURE — G0156 HHCP-SVS OF AIDE,EA 15 MIN: HCPCS

## 2018-01-01 PROCEDURE — A4520 INCONTINENCE GARMENT ANYTYPE: HCPCS

## 2018-01-01 PROCEDURE — G0299 HHS/HOSPICE OF RN EA 15 MIN: HCPCS

## 2018-01-01 PROCEDURE — A4344 CATH INDW FOLEY 2 WAY SILICN: HCPCS

## 2018-01-01 PROCEDURE — A5120 SKIN BARRIER, WIPE OR SWAB: HCPCS

## 2018-01-01 PROCEDURE — A4927 NON-STERILE GLOVES: HCPCS

## 2018-01-01 PROCEDURE — G0155 HHCP-SVS OF CSW,EA 15 MIN: HCPCS

## 2018-01-01 PROCEDURE — A6250 SKIN SEAL PROTECT MOISTURIZR: HCPCS

## 2018-01-01 PROCEDURE — A4335 INCONTINENCE SUPPLY: HCPCS

## 2018-01-01 PROCEDURE — 6650303 HCR  ALOE VESTA BATH/BODY SHAMPOO

## 2018-01-01 PROCEDURE — 6650990 HC HOSPICE AND HOME CARE RX REV CODE 0250

## 2018-01-01 RX ADMIN — MORPHINE SULFATE 5 MG: 100 SOLUTION ORAL at 15:00

## 2018-01-01 RX ADMIN — MORPHINE SULFATE 5 MG: 100 SOLUTION ORAL at 16:10

## 2018-01-01 RX ADMIN — MORPHINE SULFATE 5 MG: 100 SOLUTION ORAL at 12:05

## 2018-01-01 SDOH — ECONOMIC STABILITY: HOUSING INSECURITY: UNSAFE COOKING RANGE AREA: 0

## 2018-01-01 SDOH — ECONOMIC STABILITY: HOUSING INSECURITY: UNSAFE APPLIANCES: 0

## 2018-01-01 SDOH — ECONOMIC STABILITY: HOUSING INSECURITY
HOME SAFETY: WAY FROM YOUR OXYGEN. THIS INCLUDES CLEANING PRODUCTS THAT CONTAIN OIL, GREASE, ALCOHOL, OR OTHER LIQUIDS THAT CAN BURN.  DO NOT USE VASELINE OR OTHER PETROLEUM BASED CREAMS AND LOTIONS ON YOUR FACE OR UPPER BODY.  AVOID TRIPPING OVER OXYGEN TUBING.

## 2018-01-01 SDOH — ECONOMIC STABILITY: GENERAL

## 2018-01-01 SDOH — ECONOMIC STABILITY: HOUSING INSECURITY: HOME SAFETY: UM-BASED CREAMS AND LOTIONS ON YOUR FACE OR UPPER PART OF YOUR BODY  AVOID TRIPPING OVER OXYGEN TUBING

## 2018-01-01 SDOH — ECONOMIC STABILITY: HOUSING INSECURITY
HOME SAFETY: DS THAT MAY CATCH FIRE AWAY FROM YOUR OXYGEN. THIS INCLUDES CLEANING PRODUCTS THAT CONTAIN OIL, GREASE, ALCOHOL, OR OTHER LIQUIDS THAT CAN BURN.  KEEP OXYGEN AWAY FROM ANY OPEN FLAMES OR BASEBOARD HEATING SYSTEMS  DO NOT USE VASELINE OR OTHER PETROLE

## 2018-01-01 SDOH — ECONOMIC STABILITY: HOUSING INSECURITY
HOME SAFETY: OXYGEN ASSESSMENT COMPLETED.  THE FOLLOWING OXYGEN SAFETY EDUCATION WAS PROVIDED:  NO OPEN FUMES.  POST 'OXYGEN IN USE' SIGN ON EXTERNAL DOOR OF HOME.  NEED FUNCTIONAL FIRE EXTINGUISHER AND SMOKE DETECTORS IN HOME.  KEEP LIQUIDS THAT MAY CATCH FIRE A

## 2018-01-01 SDOH — ECONOMIC STABILITY: HOUSING INSECURITY
HOME SAFETY: OXYGEN RISK ASSESSMENT COMPLETED.  THE FOLLOWING OXYGEN SAFETY EDUCATION WAS PROVIDED:  NO OPEN FLAMES  POSTED  'OXYGEN IN USE' SIGN ON EXTERNAL DOOR  OR WINDOW OF HOME  NEED FOR A  FUNCTIONAL FIRE EXTINGUISHER AND SMOKE DETECTORS IN HOME  KEEP LIQUI

## 2018-01-01 SDOH — ECONOMIC STABILITY: HOUSING INSECURITY: HOME SAFETY: HOME IS CLUTTERED, WALKWAYS CLEAR

## 2018-01-01 SDOH — ECONOMIC STABILITY: HOUSING INSECURITY: HOME SAFETY: HOME CLUTTERED.

## 2018-01-01 ASSESSMENT — ENCOUNTER SYMPTOMS
SPUTUM AMOUNT: COPIOUS
BOWEL PATTERN NORMAL: 1
FATIGUE: 1
SHORTNESS OF BREATH: 1
LOWER EXTREMITY EDEMA: 1
COUGH CHARACTERISTICS: PRODUCTIVE
CONSTIPATION: 1
CHOKING: 1
LAST BOWEL MOVEMENT: 64677
SLEEP QUALITY: FAIR
SPUTUM CONSISTENCY: THIN
DYSPNEA ACTIVITY LEVEL: AT REST
SPUTUM CONSISTENCY: THIN
COUGH CHARACTERISTICS: PRODUCTIVE
SHORTNESS OF BREATH: 1
STOOL FREQUENCY: DAILY
SPUTUM PRODUCTION: 1
SHORTNESS OF BREATH: 1
SHORTNESS OF BREATH: 1
SPUTUM AMOUNT: MODERATE
COUGH CHARACTERISTICS: STRONG
COUGH CHARACTERISTICS: PRODUCTIVE
BOWEL INCONTINENCE: 1
COUGH: 1
SPUTUM CONSISTENCY: FROTHY
SPUTUM AMOUNT: COPIOUS
SPUTUM CONSISTENCY: THIN
COUGH CHARACTERISTICS: STRONG
COUGH CHARACTERISTICS: CONGESTED
SPUTUM CONSISTENCY: THICK
SPUTUM PRODUCTION: 1
SPUTUM CONSISTENCY: THIN
SLEEP QUALITY: FAIR
CONSTIPATION: 1
SPUTUM COLOR: BLOODY
STOOL FREQUENCY: LESS THAN DAILY
COUGH: 1
SPUTUM AMOUNT: COPIOUS
STOOL FREQUENCY: DAILY
DYSPNEA ON EXERTION: 1
SPUTUM COLOR: YELLOW
COUGH CHARACTERISTICS: STRONG
SHORTNESS OF BREATH: 1
LAST BOWEL MOVEMENT: 64762
SPUTUM AMOUNT: COPIOUS
STOOL FREQUENCY: DAILY
SPUTUM PRODUCTION: 1
CONSTIPATION: 1
SPUTUM PRODUCTION: 1
SPUTUM CONSISTENCY: THICK
CONSTIPATION: 1
DYSPNEA ACTIVITY LEVEL: AT REST
COUGH CHARACTERISTICS: MOIST
DYSPNEA ACTIVITY LEVEL: AT REST
COUGH CHARACTERISTICS: PRODUCTIVE
COUGH CHARACTERISTICS: PRODUCTIVE
COUGH: 1
SLEEP QUALITY: ADEQUATE
LAST BOWEL MOVEMENT: 64671
AGITATION: 1
SPUTUM CONSISTENCY: THIN
SHORTNESS OF BREATH: 1
SLEEP QUALITY: FAIR
SPUTUM PRODUCTION: 1
SHORTNESS OF BREATH: 1
DYSPNEA ACTIVITY LEVEL: AT REST
LAST BOWEL MOVEMENT: 64695
COUGH CHARACTERISTICS: STRONG
SPUTUM COLOR: YELLOW
DYSPNEA ON EXERTION: 1
STOOL FREQUENCY: LESS THAN DAILY
LAST BOWEL MOVEMENT: 64783
SPUTUM COLOR: TAN
SPUTUM AMOUNT: MODERATE
SPUTUM COLOR: BLOODY
STOOL FREQUENCY: LESS THAN DAILY
COUGH: 1
FATIGUES EASILY: 1
SPUTUM COLOR: WHITE
SPUTUM CONSISTENCY: THICK
COUGH: 1
COUGH CHARACTERISTICS: PRODUCTIVE
COUGH CHARACTERISTICS: MOIST
SHORTNESS OF BREATH: 1
SLEEP QUALITY: ADEQUATE
SHORTNESS OF BREATH: 1
SPUTUM PRODUCTION: 1
COUGH CHARACTERISTICS: MOIST
CONSTIPATION: 1
SPUTUM AMOUNT: COPIOUS
SPUTUM PRODUCTION: 1
SPUTUM PRODUCTION: 1
FATIGUE: 1
SPUTUM PRODUCTION: 1
COUGH: 1
DYSPNEA ACTIVITY LEVEL: AFTER AMBULATING LESS THAN 10 FT
RESPIRATORY PAIN: 1
SPUTUM PRODUCTION: 1
SPUTUM AMOUNT: COPIOUS
DYSPNEA ACTIVITY LEVEL: AT REST
SLEEP QUALITY: ADEQUATE
SPUTUM AMOUNT: COPIOUS
SPUTUM CONSISTENCY: THICK
SPUTUM AMOUNT: MODERATE
SPUTUM COLOR: YELLOW
FATIGUES EASILY: 1
COUGH CHARACTERISTICS: PRODUCTIVE
SPUTUM CONSISTENCY: THICK
SPUTUM PRODUCTION: 1
SHORTNESS OF BREATH: 1
SPUTUM PRODUCTION: 1
DYSPNEA ACTIVITY LEVEL: WHILE SPEAKING
STOOL FREQUENCY: LESS THAN DAILY
LAST BOWEL MOVEMENT: 64657
DRY SKIN: 1
COUGH CHARACTERISTICS: MOIST
BOWEL PATTERN NORMAL: 1
SPUTUM AMOUNT: MODERATE
CONSTIPATION: 1
SPUTUM CONSISTENCY: THIN
LAST BOWEL MOVEMENT: 64788
SPUTUM CONSISTENCY: THIN
COUGH CHARACTERISTICS: MOIST
SHORTNESS OF BREATH: 1
SPUTUM PRODUCTION: 1
DRY SKIN: 1
DIARRHEA: 1
SPUTUM COLOR: YELLOW
SPUTUM PRODUCTION: 1
COUGH: 1
SPUTUM AMOUNT: MODERATE
LAST BOWEL MOVEMENT: 64756
DYSPNEA ACTIVITY LEVEL: AT REST
FATIGUE: 1
COUGH CHARACTERISTICS: PRODUCTIVE
SPUTUM PRODUCTION: 1
SPUTUM CONSISTENCY: THICK
SPUTUM PRODUCTION: 1
SPUTUM AMOUNT: COPIOUS
COUGH: 1
BOWEL PATTERN NORMAL: 1
COUGH CHARACTERISTICS: MOIST
CHEST TIGHTNESS: 1
SPUTUM AMOUNT: COPIOUS
SPUTUM AMOUNT: MODERATE
SHORTNESS OF BREATH: 1
LAST BOWEL MOVEMENT: 64751
SPUTUM COLOR: YELLOW
SPUTUM PRODUCTION: 1
RESPIRATORY PAIN: 1
CONSTIPATION: 1
SPUTUM CONSISTENCY: THICK
SPUTUM PRODUCTION: 1
SPUTUM CONSISTENCY: THIN
SPUTUM COLOR: YELLOW
LAST BOWEL MOVEMENT: 64698
SPUTUM COLOR: YELLOW
DYSPNEA ACTIVITY LEVEL: AT REST
LAST BOWEL MOVEMENT: 64765
SPUTUM PRODUCTION: 1
COUGH: 1
LAST BOWEL MOVEMENT: 64712
SPUTUM COLOR: YELLOW
SLEEP QUALITY: FAIR
LAST BOWEL MOVEMENT: 64776
BOWEL PATTERN NORMAL: 1
CHEST TIGHTNESS: 1
SLEEP QUALITY: ADEQUATE
SPUTUM CONSISTENCY: THIN
DYSPNEA ACTIVITY LEVEL: AT REST
SPUTUM PRODUCTION: 1
BOWEL PATTERN NORMAL: 1
LAST BOWEL MOVEMENT: 64741
FATIGUES EASILY: 1
BOWEL PATTERN NORMAL: 1
DYSPNEA ACTIVITY LEVEL: AT REST
SLEEP QUALITY: ADEQUATE
STOOL FREQUENCY: DAILY
COUGH CHARACTERISTICS: PRODUCTIVE
LAST BOWEL MOVEMENT: 64719
SPUTUM COLOR: YELLOW
SPUTUM PRODUCTION: 1
COUGH: 1
SPUTUM AMOUNT: MODERATE
CONSTIPATION: 1
SPUTUM COLOR: TAN
SHORTNESS OF BREATH: 1
SHORTNESS OF BREATH: 1
AGITATION: 1
COUGH CHARACTERISTICS: PRODUCTIVE
DYSPNEA ACTIVITY LEVEL: WHILE EATING
SPUTUM CONSISTENCY: THIN
LAST BOWEL MOVEMENT: 64685
SPUTUM AMOUNT: COPIOUS
SHORTNESS OF BREATH: 1
STOOL FREQUENCY: DAILY
DYSPNEA ACTIVITY LEVEL: AT REST
SPUTUM AMOUNT: COPIOUS
COUGH CHARACTERISTICS: PRODUCTIVE
SOMNOLENCE: 1
SPUTUM COLOR: YELLOW
SLEEP QUALITY: ADEQUATE
SHORTNESS OF BREATH: 1
DYSPNEA ACTIVITY LEVEL: AT REST
DYSPNEA ACTIVITY LEVEL: AT REST
LAST BOWEL MOVEMENT: 64681
LAST BOWEL MOVEMENT: 64734
COUGH: 1
SPUTUM COLOR: TAN
SPUTUM CONSISTENCY: THICK
CONSTIPATION: 1
CONSTIPATION: 1
FATIGUES EASILY: 1
STOOL FREQUENCY: LESS THAN DAILY
SPUTUM AMOUNT: COPIOUS
SPUTUM CONSISTENCY: THICK
FATIGUE: 1
LAST BOWEL MOVEMENT: 64790
LAST BOWEL MOVEMENT: 64783
COUGH: 1
SPUTUM AMOUNT: MODERATE
COUGH CHARACTERISTICS: STRONG
COUGH: 1
FATIGUE: 1
SPUTUM CONSISTENCY: THIN
LOWER EXTREMITY EDEMA: 1
LAST BOWEL MOVEMENT: 64727
SPUTUM COLOR: GREEN
SPUTUM COLOR: YELLOW
STOOL FREQUENCY: DAILY
COUGH CHARACTERISTICS: MOIST
LAST BOWEL MOVEMENT: 64650
DYSPNEA ON EXERTION: 1
SPUTUM COLOR: TAN
SPUTUM PRODUCTION: 1
COUGH CHARACTERISTICS: STRONG
DYSPNEA ACTIVITY LEVEL: AT REST
STOOL FREQUENCY: DAILY
SPUTUM CONSISTENCY: THICK
COUGH CHARACTERISTICS: CONGESTED
COUGH CHARACTERISTICS: PRODUCTIVE
LAST BOWEL MOVEMENT: 64776
CHOKING: 1
SPUTUM COLOR: TAN
SPUTUM AMOUNT: MODERATE
SPUTUM CONSISTENCY: THICK
SPUTUM CONSISTENCY: TENACIOUS
SPUTUM AMOUNT: COPIOUS
CONSTIPATION: 1
DYSPNEA ACTIVITY LEVEL: AT REST
LAST BOWEL MOVEMENT: 64654
SPUTUM COLOR: YELLOW
SPUTUM AMOUNT: COPIOUS
DYSPNEA ACTIVITY LEVEL: AFTER AMBULATING LESS THAN 10 FT
CONSTIPATION: 1
SPUTUM COLOR: GREEN
SPUTUM PRODUCTION: 1
LAST BOWEL MOVEMENT: 64746
BOWEL PATTERN NORMAL: 1
COUGH: 1
LAST BOWEL MOVEMENT: 64705
LAST BOWEL MOVEMENT: 64664
COUGH CHARACTERISTICS: PRODUCTIVE
LAST BOWEL MOVEMENT: 64769
SHORTNESS OF BREATH: 1
CONSTIPATION: 1
COUGH CHARACTERISTICS: PRODUCTIVE

## 2018-01-01 ASSESSMENT — ACTIVITIES OF DAILY LIVING (ADL)
MONEY MANAGEMENT (EXPENSES/BILLS): NEEDS ASSISTANCE
MONEY MANAGEMENT (EXPENSES/BILLS): NEEDS ASSISTANCE
MONEY MANAGEMENT (EXPENSES/BILLS): TOTALLY DEPENDENT
MONEY MANAGEMENT (EXPENSES/BILLS): NEEDS ASSISTANCE
MONEY MANAGEMENT (EXPENSES/BILLS): NEEDS ASSISTANCE
MONEY MANAGEMENT (EXPENSES/BILLS): TOTALLY DEPENDENT
MONEY MANAGEMENT (EXPENSES/BILLS): NEEDS ASSISTANCE
MONEY MANAGEMENT (EXPENSES/BILLS): TOTALLY DEPENDENT
MONEY MANAGEMENT (EXPENSES/BILLS): NEEDS ASSISTANCE
MONEY MANAGEMENT (EXPENSES/BILLS): TOTALLY DEPENDENT
MONEY MANAGEMENT (EXPENSES/BILLS): NEEDS ASSISTANCE
MONEY MANAGEMENT (EXPENSES/BILLS): TOTALLY DEPENDENT
MONEY MANAGEMENT (EXPENSES/BILLS): NEEDS ASSISTANCE

## 2018-01-01 ASSESSMENT — SOCIAL DETERMINANTS OF HEALTH (SDOH)
FINANCIAL_CONCERNS: 1
ACTIVE STRESSOR - EXPRESSED EMOTIONAL NEED: 1
ACTIVE STRESSOR - HEALTH CHANGES: 1
ACTIVE STRESSOR - HEALTH CHANGES: 1
ACTIVE STRESSOR - EXPRESSED EMOTIONAL NEED: 1
ACTIVE STRESSOR - NO STRESS FACTORS: 1
ACTIVE STRESSOR - EXHAUSTION: 1
ACTIVE STRESSOR - LOSS OF CONTROL: 1
ACTIVE STRESSOR - NO STRESS FACTORS: 1
ACTIVE STRESSOR - NO STRESS FACTORS: 1
ACTIVE STRESSOR - LOSS OF CONTROL: 1
EXPRESSED_FINANCIAL_NEED: 1
EXPRESSED_FINANCIAL_NEED: 1
ACTIVE STRESSOR - HEALTH CHANGES: 1
ACTIVE STRESSOR - HEALTH CHANGES: 1
ACTIVE STRESSOR - EXHAUSTION: 1
ACTIVE STRESSOR - NO STRESS FACTORS: 1
ACTIVE STRESSOR - NO STRESS FACTORS: 1
ACTIVE STRESSOR - HEALTH CHANGES: 1
ACTIVE STRESSOR - NO STRESS FACTORS: 1
ACTIVE STRESSOR - EXHAUSTION: 1
ACTIVE STRESSOR - NO STRESS FACTORS: 1
ACTIVE STRESSOR - EXHAUSTION: 1
ACTIVE STRESSOR - NO STRESS FACTORS: 1
ACTIVE STRESSOR - HEALTH CHANGES: 1
FINANCIAL_CONCERNS: 1

## 2018-05-29 ENCOUNTER — APPOINTMENT (OUTPATIENT)
Dept: HOSPICE | Facility: HOSPICE | Age: 83
End: 2018-05-29
Payer: COMMERCIAL

## 2018-06-01 ENCOUNTER — APPOINTMENT (OUTPATIENT)
Dept: HOSPICE | Facility: HOSPICE | Age: 83
End: 2018-06-01
Payer: COMMERCIAL

## 2018-06-05 ENCOUNTER — APPOINTMENT (OUTPATIENT)
Dept: HOSPICE | Facility: HOSPICE | Age: 83
End: 2018-06-05
Payer: COMMERCIAL

## 2018-06-08 ENCOUNTER — APPOINTMENT (OUTPATIENT)
Dept: HOSPICE | Facility: HOSPICE | Age: 83
End: 2018-06-08
Payer: COMMERCIAL

## 2022-06-12 NOTE — PALLIATIVE CARE
"Palliative Care follow-up  Met with patient at bedside.  Sleeping but awoke easily and oriented.  Patient stated that he did not want the tube in tonight, but might change his mind tomorrow.  He asked me to call Hi (Justin, . Son) to update him.   PC to Hi.  I explained that the patient had removed his feeding tube and was refusing to have it replaced.  Hi stated that he would make him put it back in.  We discussed honoring the patient's wishes.  Hi stated understanding and agreement, \"it's just so hard but your right we have to let him do it\".  He stated that the patient has been reconsidering surgery with Dr. Coles, which Hi does not agree with, but again stated that he has the right to chose.    We agreed to met 12/10/17 @ 1pm to review and discuss patient's wishes moving forward.    Updated:     Plan: Family meeting 12/10/17 1pm.      Thank you for allowing Palliative Care to participate in this patient's care. Please feel free to call [phone] with any questions or concerns.  " show

## (undated) DEVICE — SLEEVE, VASO, THIGH, MED

## (undated) DEVICE — TOWELS CLOTH SURGICAL - (4/PK 20PK/CA)

## (undated) DEVICE — CANISTER SUCTION 3000ML MECHANICAL FILTER AUTO SHUTOFF MEDI-VAC NONSTERILE LF DISP  (40EA/CA)

## (undated) DEVICE — BLADE SURGICAL #11 - (50/BX)

## (undated) DEVICE — SET LEADWIRE 5 LEAD BEDSIDE DISPOSABLE ECG (1SET OF 5/EA)

## (undated) DEVICE — HEAD HOLDER JUNIOR/ADULT

## (undated) DEVICE — GLOVE BIOGEL SZ 6 PF LATEX - (50EA/BX 4BX/CA)

## (undated) DEVICE — MASK ANESTHESIA ADULT  - (100/CA)

## (undated) DEVICE — DRAPE SURGICAL U 77X120 - (10/CA)

## (undated) DEVICE — SUTURE 2-0 SILK SH (36PK/BX)

## (undated) DEVICE — BLADE SURGICAL #15 - (50/BX 3BX/CA)

## (undated) DEVICE — SODIUM CHL IRRIGATION 0.9% 1000ML (12EA/CA)

## (undated) DEVICE — PROTECTOR ULNA NERVE - (36PR/CA)

## (undated) DEVICE — SUCTION INSTRUMENT YANKAUER BULBOUS TIP W/O VENT (50EA/CA)

## (undated) DEVICE — TUBE TRACHEOSTOMY BLUSELECT SUCTIONAIDE SIZE 7 (1/EA)

## (undated) DEVICE — COVER TABLE 44 X 90 - (22/CA)

## (undated) DEVICE — KIT ANESTHESIA W/CIRCUIT & 3/LT BAG W/FILTER (20EA/CA)

## (undated) DEVICE — ELECTRODE 850 FOAM ADHESIVE - HYDROGEL RADIOTRNSPRNT (50/PK)

## (undated) DEVICE — GLOVE BIOGEL PI INDICATOR SZ 6.5 SURGICAL PF LF - (50/BX 4BX/CA)

## (undated) DEVICE — SUTURE 3-0 SILK SH 30 (36PK/BX)

## (undated) DEVICE — SET EXTENSION WITH 2 PORTS (48EA/CA) ***PART #2C8610 IS A SUBSTITUTE*****

## (undated) DEVICE — SENSOR SPO2 NEO LNCS ADHESIVE (20/BX) SEE USER NOTES

## (undated) DEVICE — SPONGE PEANUT - (5/PK 50PK/CA)

## (undated) DEVICE — PACK MINOR BASIN - (2EA/CA)

## (undated) DEVICE — NEPTUNE 4 PORT MANIFOLD - (20/PK)

## (undated) DEVICE — SUTURE GENERAL

## (undated) DEVICE — GLOVE BIOGEL SZ 7.5 SURGICAL PF LTX - (50PR/BX 4BX/CA)

## (undated) DEVICE — GOWN SURGEONS X-LARGE - DISP. (30/CA)

## (undated) DEVICE — TUBING CLEARLINK DUO-VENT - C-FLO (48EA/CA)

## (undated) DEVICE — DRAPE MAYO STAND - (30/CA)

## (undated) DEVICE — TRAY SKIN SCRUB PVP WET (20EA/CA) PART #DYND70356 DISCONTINUED